# Patient Record
Sex: MALE | Race: WHITE | NOT HISPANIC OR LATINO | URBAN - METROPOLITAN AREA
[De-identification: names, ages, dates, MRNs, and addresses within clinical notes are randomized per-mention and may not be internally consistent; named-entity substitution may affect disease eponyms.]

---

## 2019-05-31 ENCOUNTER — OFFICE VISIT (OUTPATIENT)
Dept: URGENT CARE | Facility: CLINIC | Age: 59
End: 2019-05-31

## 2019-05-31 VITALS
WEIGHT: 152 LBS | HEIGHT: 72 IN | HEART RATE: 82 BPM | SYSTOLIC BLOOD PRESSURE: 122 MMHG | BODY MASS INDEX: 20.59 KG/M2 | OXYGEN SATURATION: 98 % | RESPIRATION RATE: 16 BRPM | TEMPERATURE: 98.2 F | DIASTOLIC BLOOD PRESSURE: 80 MMHG

## 2019-05-31 DIAGNOSIS — B02.9 HERPES ZOSTER WITHOUT COMPLICATION: ICD-10-CM

## 2019-05-31 PROCEDURE — 99202 OFFICE O/P NEW SF 15 MIN: CPT | Performed by: EMERGENCY MEDICINE

## 2019-05-31 RX ORDER — VALACYCLOVIR HYDROCHLORIDE 1 G/1
1000 TABLET, FILM COATED ORAL 3 TIMES DAILY
Qty: 21 TAB | Refills: 0 | Status: SHIPPED | OUTPATIENT
Start: 2019-05-31 | End: 2019-06-07

## 2019-05-31 RX ORDER — IBUPROFEN 800 MG/1
800 TABLET ORAL EVERY 8 HOURS PRN
Qty: 20 TAB | Refills: 0 | Status: SHIPPED | OUTPATIENT
Start: 2019-05-31

## 2019-05-31 ASSESSMENT — ENCOUNTER SYMPTOMS
SENSORY CHANGE: 1
FOCAL WEAKNESS: 0
ABDOMINAL PAIN: 0
TINGLING: 0
COUGH: 0
BACK PAIN: 0
FEVER: 0

## 2019-06-01 NOTE — PROGRESS NOTES
Subjective:      Heri Solis is a 58 y.o. male who presents with Rash (x 6 days, painful rash under lt. arm)            Rash   This is a new problem. The current episode started in the past 7 days. The problem has been gradually worsening since onset. The affected locations include the torso and chest. The rash is characterized by blistering and pain. He was exposed to nothing. Pertinent negatives include no cough or fever. Past treatments include nothing. His past medical history is significant for varicella.       Review of Systems   Constitutional: Negative for fever.   Respiratory: Negative for cough.    Cardiovascular: Negative for chest pain.   Gastrointestinal: Negative for abdominal pain.   Musculoskeletal: Negative for back pain.   Skin: Positive for rash. Negative for itching.   Neurological: Positive for sensory change. Negative for tingling and focal weakness.     PMH:  has no past medical history on file.  MEDS:   Current Outpatient Prescriptions:   •  valacyclovir (VALTREX) 1 GM Tab, Take 1 Tab by mouth 3 times a day for 7 days., Disp: 21 Tab, Rfl: 0  •  ibuprofen (MOTRIN) 800 MG Tab, Take 1 Tab by mouth every 8 hours as needed for Moderate Pain or Inflammation., Disp: 20 Tab, Rfl: 0  ALLERGIES: No Known Allergies  SURGHX: History reviewed. No pertinent surgical history.  SOCHX:  reports that he has never smoked. He has never used smokeless tobacco.  FH: family history is not on file.       Objective:     /80 (BP Location: Right arm, Patient Position: Sitting, BP Cuff Size: Adult)   Pulse 82   Temp 36.8 °C (98.2 °F) (Temporal)   Resp 16   Ht 1.829 m (6')   Wt 68.9 kg (152 lb)   SpO2 98%   BMI 20.61 kg/m²      Physical Exam   Constitutional: He appears well-developed and well-nourished. He is cooperative. He does not have a sickly appearance. He does not appear ill. No distress.   Cardiovascular: Normal rate, regular rhythm and normal heart sounds.    Pulmonary/Chest: Effort normal and  breath sounds normal.   Abdominal: He exhibits no distension. There is no CVA tenderness.   Musculoskeletal:        Thoracic back: He exhibits no bony tenderness.   Neurological: He is alert.   Skin: Skin is warm and dry. Rash noted. Rash is vesicular.   Patches of erythematous based vesicular eruption left scapular, axillary, pectoral region.  No evidence of secondary bacterial infection.   Psychiatric: He has a normal mood and affect.               Assessment/Plan:     1. Herpes zoster without complication  - valacyclovir (VALTREX) 1 GM Tab; Take 1 Tab by mouth 3 times a day for 7 days.  Dispense: 21 Tab; Refill: 0  - ibuprofen (MOTRIN) 800 MG Tab; Take 1 Tab by mouth every 8 hours as needed for Moderate Pain or Inflammation.  Dispense: 20 Tab; Refill: 0  F/U PCP

## 2019-06-05 ENCOUNTER — NON-PROVIDER VISIT (OUTPATIENT)
Dept: URGENT CARE | Facility: CLINIC | Age: 59
End: 2019-06-05

## 2019-06-05 DIAGNOSIS — Z02.1 PRE-EMPLOYMENT DRUG SCREENING: ICD-10-CM

## 2019-06-05 LAB
AMP AMPHETAMINE: NORMAL
COC COCAINE: NORMAL
INT CON NEG: NORMAL
INT CON POS: NORMAL
MET METHAMPHETAMINES: NORMAL
OPI OPIATES: NORMAL
PCP PHENCYCLIDINE: NORMAL
POC DRUG COMMENT 753798-OCCUPATIONAL HEALTH: NORMAL
THC: NORMAL

## 2019-06-05 PROCEDURE — 80305 DRUG TEST PRSMV DIR OPT OBS: CPT | Performed by: NURSE PRACTITIONER

## 2020-02-13 ENCOUNTER — TELEPHONE (OUTPATIENT)
Dept: SCHEDULING | Facility: IMAGING CENTER | Age: 60
End: 2020-02-13

## 2021-03-15 DIAGNOSIS — Z23 NEED FOR VACCINATION: ICD-10-CM

## 2023-01-26 PROBLEM — S52.502A DISTAL RADIUS FRACTURE, LEFT: Status: ACTIVE | Noted: 2023-01-26

## 2023-01-26 NOTE — H&P (VIEW-ONLY)
[facility] ORTHO TRAUMA    ORTHOPAEDIC TRAUMA PROGRESS NOTE    History of Present Illness:    Patient is following for nonoperative management management of left wrist fracture sustained on 12/11/2022.  We have been continue to watch this and up until last week his pain was fairly well controlled.  He is not sure if he did something in his sleep that reinjured it but his pain has been getting worse and he feels more swollen and stiff.  He tells me that initially he did not elect to do surgery because he did not have insurance coverage at that time but he does now and he is very interested in seeing if he can get this fixed.  He is very active and is worried that if he does not get it fixed operatively that he will regain as much range of motion.  No focal weakness or paresthesias    ROS: negative otherwise than mentioned in HPI    Chart Review:  All past medical/surgical/family/social histories, medications, and allergies were reviewed and there are no changes.    Physical Exam:  There were no vitals filed for this visit.  General: Awake, appropriate, cooperative, and in no acute distress  HEENT: Normocephalic, atraumatic  Neck: Supple, no pain to motion  Chest/Pulmonary: breathing unlabored, no audible wheezing  Psych: Alert and oriented x3. Normal mood and affect.  Ortho: The patient is still mildly tender over the distal radius and has notable swelling about the wrist and hand.  He is not able to make a fist without assistance, but he can range all fingers freely and his EPL is intact.  He is very stiff at the wrist as expected from being in a cast for 7 weeks. NVI      Radiographs:  DX-WRIST-COMPLETE 3+ LEFT  Plain film x-ray taken today independently reviewed by myself include ap   lateral and oblique views left wrist demonstrating subacute distal radius   fracture with decreased radial height and dorsal angulation      Assessment & Plan:  Patient is following for nonoperative management management of left wrist  fracture sustained on 12/11/2022.     I had a thorough discussion with the patient regarding the diagnosis including both operative and nonoperative treatment.  After obtaining consent from the patient, we will proceed with operative management. I recommended a ORIF left wrist. Risks of surgery include, but not limited to, wound problems, infection, nerve injury, vascular injury, need for further surgery. Risk for weakness, stiffness, blood clots, chance for reinjury, malunion, nonunion, overcorrection, undercorrection and recurrence. I discussed the risks/ benefits/ complications associated with narcotic use including the potential for addiction. All of the patient's questions were answered today. We will schedule this in the near future at his convenience. I provided him a  velcro wrist brace to wear post-operatively. I prescribed the patient some pain medications. Myself and/or Dr. Fu will follow up with them postoperatively. Consulted Dr. Fu who agrees.       Patient was in agreement with the above-mentioned plan; all questions were answered to their apparent satisfaction.    Ashlee Whatley P.A.-C.  Date: 1/26/2023  Time: 1:56 PM            The EPIC system uses voice transcription software. This is not formal transcription service but is partially electronic. Whilst I have checked the document for errors and corrected them to the best of my ability, there may be grammatical and typographical errors as well as incorrect words placed due to the transcription software that I did not detect and correct

## 2023-01-27 ENCOUNTER — PRE-ADMISSION TESTING (OUTPATIENT)
Dept: ADMISSIONS | Facility: MEDICAL CENTER | Age: 63
End: 2023-01-27
Attending: ORTHOPAEDIC SURGERY
Payer: MEDICAID

## 2023-01-27 ENCOUNTER — ANESTHESIA EVENT (OUTPATIENT)
Dept: SURGERY | Facility: MEDICAL CENTER | Age: 63
End: 2023-01-27
Payer: MEDICAID

## 2023-01-28 ENCOUNTER — APPOINTMENT (OUTPATIENT)
Dept: RADIOLOGY | Facility: MEDICAL CENTER | Age: 63
End: 2023-01-28
Attending: ORTHOPAEDIC SURGERY
Payer: MEDICAID

## 2023-01-28 ENCOUNTER — ANESTHESIA (OUTPATIENT)
Dept: SURGERY | Facility: MEDICAL CENTER | Age: 63
End: 2023-01-28
Payer: MEDICAID

## 2023-01-28 ENCOUNTER — HOSPITAL ENCOUNTER (OUTPATIENT)
Facility: MEDICAL CENTER | Age: 63
End: 2023-01-28
Attending: ORTHOPAEDIC SURGERY | Admitting: ORTHOPAEDIC SURGERY
Payer: MEDICAID

## 2023-01-28 VITALS
OXYGEN SATURATION: 97 % | BODY MASS INDEX: 22.28 KG/M2 | WEIGHT: 164.46 LBS | TEMPERATURE: 97.6 F | RESPIRATION RATE: 19 BRPM | HEART RATE: 82 BPM | HEIGHT: 72 IN | SYSTOLIC BLOOD PRESSURE: 129 MMHG | DIASTOLIC BLOOD PRESSURE: 70 MMHG

## 2023-01-28 DIAGNOSIS — G89.18 POSTOPERATIVE PAIN: ICD-10-CM

## 2023-01-28 DIAGNOSIS — S52.502A CLOSED TRAUMATIC DISPLACED FRACTURE OF DISTAL END OF LEFT RADIUS, INITIAL ENCOUNTER: ICD-10-CM

## 2023-01-28 PROCEDURE — 160028 HCHG SURGERY MINUTES - 1ST 30 MINS LEVEL 3: Performed by: ORTHOPAEDIC SURGERY

## 2023-01-28 PROCEDURE — 160046 HCHG PACU - 1ST 60 MINS PHASE II: Performed by: ORTHOPAEDIC SURGERY

## 2023-01-28 PROCEDURE — 160002 HCHG RECOVERY MINUTES (STAT): Performed by: ORTHOPAEDIC SURGERY

## 2023-01-28 PROCEDURE — 25400 REPAIR RADIUS OR ULNA: CPT | Mod: LT | Performed by: ORTHOPAEDIC SURGERY

## 2023-01-28 PROCEDURE — 25290 INCISE WRIST/FOREARM TENDON: CPT | Mod: LT | Performed by: ORTHOPAEDIC SURGERY

## 2023-01-28 PROCEDURE — 64415 NJX AA&/STRD BRCH PLXS IMG: CPT | Mod: 59,LT | Performed by: ANESTHESIOLOGY

## 2023-01-28 PROCEDURE — 160035 HCHG PACU - 1ST 60 MINS PHASE I: Performed by: ORTHOPAEDIC SURGERY

## 2023-01-28 PROCEDURE — 160047 HCHG PACU  - EA ADDL 30 MINS PHASE II: Performed by: ORTHOPAEDIC SURGERY

## 2023-01-28 PROCEDURE — 160009 HCHG ANES TIME/MIN: Performed by: ORTHOPAEDIC SURGERY

## 2023-01-28 PROCEDURE — C1713 ANCHOR/SCREW BN/BN,TIS/BN: HCPCS | Performed by: ORTHOPAEDIC SURGERY

## 2023-01-28 PROCEDURE — 700111 HCHG RX REV CODE 636 W/ 250 OVERRIDE (IP)

## 2023-01-28 PROCEDURE — 160039 HCHG SURGERY MINUTES - EA ADDL 1 MIN LEVEL 3: Performed by: ORTHOPAEDIC SURGERY

## 2023-01-28 PROCEDURE — 64415 NJX AA&/STRD BRCH PLXS IMG: CPT | Performed by: ORTHOPAEDIC SURGERY

## 2023-01-28 PROCEDURE — 01830 ANES ARTHR/NDSC WRST/HND NOS: CPT | Performed by: ANESTHESIOLOGY

## 2023-01-28 PROCEDURE — 160048 HCHG OR STATISTICAL LEVEL 1-5: Performed by: ORTHOPAEDIC SURGERY

## 2023-01-28 PROCEDURE — 700111 HCHG RX REV CODE 636 W/ 250 OVERRIDE (IP): Performed by: ANESTHESIOLOGY

## 2023-01-28 PROCEDURE — 700105 HCHG RX REV CODE 258: Performed by: ORTHOPAEDIC SURGERY

## 2023-01-28 PROCEDURE — 160025 RECOVERY II MINUTES (STATS): Performed by: ORTHOPAEDIC SURGERY

## 2023-01-28 PROCEDURE — 700101 HCHG RX REV CODE 250: Performed by: ANESTHESIOLOGY

## 2023-01-28 PROCEDURE — 73100 X-RAY EXAM OF WRIST: CPT | Mod: LT

## 2023-01-28 DEVICE — SCREW LOCKING 2.6MM X 18MM VARIABLE ANGLE LOCKING 2TX5=10 (1EA): Type: IMPLANTABLE DEVICE | Site: WRIST | Status: FUNCTIONAL

## 2023-01-28 DEVICE — SCREW LOCKING 2.6MM X 16MM VARIABLE ANGLE LOCKING 2TX5=10 (1EA): Type: IMPLANTABLE DEVICE | Site: WRIST | Status: FUNCTIONAL

## 2023-01-28 DEVICE — IMPLANTABLE DEVICE: Type: IMPLANTABLE DEVICE | Site: WRIST | Status: FUNCTIONAL

## 2023-01-28 DEVICE — SCREW 3.5 MM NON-LOCKING TI X 12MM LONG (6TX8+2TX5=58): Type: IMPLANTABLE DEVICE | Site: WRIST | Status: FUNCTIONAL

## 2023-01-28 DEVICE — SCREW LOCKING 2.3MM X 22MM 2TX5=10 (1EA): Type: IMPLANTABLE DEVICE | Site: WRIST | Status: FUNCTIONAL

## 2023-01-28 RX ORDER — LIDOCAINE HYDROCHLORIDE 20 MG/ML
INJECTION, SOLUTION EPIDURAL; INFILTRATION; INTRACAUDAL; PERINEURAL PRN
Status: DISCONTINUED | OUTPATIENT
Start: 2023-01-28 | End: 2023-01-28 | Stop reason: SURG

## 2023-01-28 RX ORDER — ROPIVACAINE HYDROCHLORIDE 5 MG/ML
INJECTION, SOLUTION EPIDURAL; INFILTRATION; PERINEURAL
Status: COMPLETED | OUTPATIENT
Start: 2023-01-28 | End: 2023-01-28

## 2023-01-28 RX ORDER — LABETALOL HYDROCHLORIDE 5 MG/ML
5 INJECTION, SOLUTION INTRAVENOUS
Status: DISCONTINUED | OUTPATIENT
Start: 2023-01-28 | End: 2023-01-28 | Stop reason: HOSPADM

## 2023-01-28 RX ORDER — OXYCODONE HYDROCHLORIDE 5 MG/1
5-10 TABLET ORAL EVERY 6 HOURS PRN
Qty: 30 TABLET | Refills: 0 | Status: SHIPPED | OUTPATIENT
Start: 2023-01-28 | End: 2023-02-04

## 2023-01-28 RX ORDER — DIPHENHYDRAMINE HYDROCHLORIDE 50 MG/ML
12.5 INJECTION INTRAMUSCULAR; INTRAVENOUS
Status: DISCONTINUED | OUTPATIENT
Start: 2023-01-28 | End: 2023-01-28 | Stop reason: HOSPADM

## 2023-01-28 RX ORDER — DEXAMETHASONE SODIUM PHOSPHATE 4 MG/ML
INJECTION, SOLUTION INTRA-ARTICULAR; INTRALESIONAL; INTRAMUSCULAR; INTRAVENOUS; SOFT TISSUE PRN
Status: DISCONTINUED | OUTPATIENT
Start: 2023-01-28 | End: 2023-01-28 | Stop reason: SURG

## 2023-01-28 RX ORDER — MIDAZOLAM HYDROCHLORIDE 1 MG/ML
INJECTION INTRAMUSCULAR; INTRAVENOUS PRN
Status: DISCONTINUED | OUTPATIENT
Start: 2023-01-28 | End: 2023-01-28 | Stop reason: SURG

## 2023-01-28 RX ORDER — HYDROMORPHONE HYDROCHLORIDE 1 MG/ML
0.4 INJECTION, SOLUTION INTRAMUSCULAR; INTRAVENOUS; SUBCUTANEOUS
Status: DISCONTINUED | OUTPATIENT
Start: 2023-01-28 | End: 2023-01-28 | Stop reason: HOSPADM

## 2023-01-28 RX ORDER — HALOPERIDOL 5 MG/ML
1 INJECTION INTRAMUSCULAR
Status: DISCONTINUED | OUTPATIENT
Start: 2023-01-28 | End: 2023-01-28 | Stop reason: HOSPADM

## 2023-01-28 RX ORDER — CEFAZOLIN SODIUM 1 G/3ML
2 INJECTION, POWDER, FOR SOLUTION INTRAMUSCULAR; INTRAVENOUS ONCE
Status: COMPLETED | OUTPATIENT
Start: 2023-01-28 | End: 2023-01-28

## 2023-01-28 RX ORDER — METOPROLOL TARTRATE 1 MG/ML
1 INJECTION, SOLUTION INTRAVENOUS
Status: DISCONTINUED | OUTPATIENT
Start: 2023-01-28 | End: 2023-01-28 | Stop reason: HOSPADM

## 2023-01-28 RX ORDER — MEPERIDINE HYDROCHLORIDE 25 MG/ML
12.5 INJECTION INTRAMUSCULAR; INTRAVENOUS; SUBCUTANEOUS
Status: DISCONTINUED | OUTPATIENT
Start: 2023-01-28 | End: 2023-01-28 | Stop reason: HOSPADM

## 2023-01-28 RX ORDER — OXYCODONE HCL 5 MG/5 ML
10 SOLUTION, ORAL ORAL
Status: DISCONTINUED | OUTPATIENT
Start: 2023-01-28 | End: 2023-01-28 | Stop reason: HOSPADM

## 2023-01-28 RX ORDER — OXYCODONE HCL 5 MG/5 ML
5 SOLUTION, ORAL ORAL
Status: DISCONTINUED | OUTPATIENT
Start: 2023-01-28 | End: 2023-01-28 | Stop reason: HOSPADM

## 2023-01-28 RX ORDER — MIDAZOLAM HYDROCHLORIDE 1 MG/ML
1 INJECTION INTRAMUSCULAR; INTRAVENOUS
Status: DISCONTINUED | OUTPATIENT
Start: 2023-01-28 | End: 2023-01-28 | Stop reason: HOSPADM

## 2023-01-28 RX ORDER — MAGNESIUM SULFATE HEPTAHYDRATE 40 MG/ML
INJECTION, SOLUTION INTRAVENOUS PRN
Status: DISCONTINUED | OUTPATIENT
Start: 2023-01-28 | End: 2023-01-28 | Stop reason: SURG

## 2023-01-28 RX ORDER — ONDANSETRON 2 MG/ML
4 INJECTION INTRAMUSCULAR; INTRAVENOUS
Status: DISCONTINUED | OUTPATIENT
Start: 2023-01-28 | End: 2023-01-28 | Stop reason: HOSPADM

## 2023-01-28 RX ORDER — DEXAMETHASONE SODIUM PHOSPHATE 4 MG/ML
INJECTION, SOLUTION INTRA-ARTICULAR; INTRALESIONAL; INTRAMUSCULAR; INTRAVENOUS; SOFT TISSUE
Status: COMPLETED | OUTPATIENT
Start: 2023-01-28 | End: 2023-01-28

## 2023-01-28 RX ORDER — HYDROMORPHONE HYDROCHLORIDE 1 MG/ML
0.2 INJECTION, SOLUTION INTRAMUSCULAR; INTRAVENOUS; SUBCUTANEOUS
Status: DISCONTINUED | OUTPATIENT
Start: 2023-01-28 | End: 2023-01-28 | Stop reason: HOSPADM

## 2023-01-28 RX ORDER — HYDROMORPHONE HYDROCHLORIDE 1 MG/ML
0.1 INJECTION, SOLUTION INTRAMUSCULAR; INTRAVENOUS; SUBCUTANEOUS
Status: DISCONTINUED | OUTPATIENT
Start: 2023-01-28 | End: 2023-01-28 | Stop reason: HOSPADM

## 2023-01-28 RX ORDER — SODIUM CHLORIDE, SODIUM LACTATE, POTASSIUM CHLORIDE, CALCIUM CHLORIDE 600; 310; 30; 20 MG/100ML; MG/100ML; MG/100ML; MG/100ML
INJECTION, SOLUTION INTRAVENOUS CONTINUOUS
Status: ACTIVE | OUTPATIENT
Start: 2023-01-28 | End: 2023-01-28

## 2023-01-28 RX ORDER — ONDANSETRON 2 MG/ML
INJECTION INTRAMUSCULAR; INTRAVENOUS PRN
Status: DISCONTINUED | OUTPATIENT
Start: 2023-01-28 | End: 2023-01-28 | Stop reason: SURG

## 2023-01-28 RX ORDER — ALBUTEROL SULFATE 2.5 MG/3ML
2.5 SOLUTION RESPIRATORY (INHALATION)
Status: DISCONTINUED | OUTPATIENT
Start: 2023-01-28 | End: 2023-01-28 | Stop reason: HOSPADM

## 2023-01-28 RX ORDER — SODIUM CHLORIDE, SODIUM LACTATE, POTASSIUM CHLORIDE, CALCIUM CHLORIDE 600; 310; 30; 20 MG/100ML; MG/100ML; MG/100ML; MG/100ML
INJECTION, SOLUTION INTRAVENOUS CONTINUOUS
Status: DISCONTINUED | OUTPATIENT
Start: 2023-01-28 | End: 2023-01-28 | Stop reason: HOSPADM

## 2023-01-28 RX ORDER — HYDRALAZINE HYDROCHLORIDE 20 MG/ML
5 INJECTION INTRAMUSCULAR; INTRAVENOUS
Status: DISCONTINUED | OUTPATIENT
Start: 2023-01-28 | End: 2023-01-28 | Stop reason: HOSPADM

## 2023-01-28 RX ADMIN — ROPIVACAINE HYDROCHLORIDE 20 ML: 5 INJECTION, SOLUTION EPIDURAL; INFILTRATION; PERINEURAL at 08:31

## 2023-01-28 RX ADMIN — EPHEDRINE SULFATE 5 MG: 50 INJECTION, SOLUTION INTRAVENOUS at 09:26

## 2023-01-28 RX ADMIN — MIDAZOLAM HYDROCHLORIDE 1 MG: 1 INJECTION, SOLUTION INTRAMUSCULAR; INTRAVENOUS at 08:31

## 2023-01-28 RX ADMIN — LIDOCAINE HYDROCHLORIDE 80 MG: 20 INJECTION, SOLUTION EPIDURAL; INFILTRATION; INTRACAUDAL at 09:16

## 2023-01-28 RX ADMIN — EPHEDRINE SULFATE 5 MG: 50 INJECTION, SOLUTION INTRAVENOUS at 09:46

## 2023-01-28 RX ADMIN — MAGNESIUM SULFATE HEPTAHYDRATE 1 G: 40 INJECTION, SOLUTION INTRAVENOUS at 09:31

## 2023-01-28 RX ADMIN — EPHEDRINE SULFATE 5 MG: 50 INJECTION, SOLUTION INTRAVENOUS at 09:36

## 2023-01-28 RX ADMIN — DEXAMETHASONE SODIUM PHOSPHATE 4 MG: 4 INJECTION, SOLUTION INTRA-ARTICULAR; INTRALESIONAL; INTRAMUSCULAR; INTRAVENOUS; SOFT TISSUE at 09:25

## 2023-01-28 RX ADMIN — SODIUM CHLORIDE, POTASSIUM CHLORIDE, SODIUM LACTATE AND CALCIUM CHLORIDE: 600; 310; 30; 20 INJECTION, SOLUTION INTRAVENOUS at 06:54

## 2023-01-28 RX ADMIN — ONDANSETRON 4 MG: 2 INJECTION INTRAMUSCULAR; INTRAVENOUS at 09:39

## 2023-01-28 RX ADMIN — SODIUM CHLORIDE, POTASSIUM CHLORIDE, SODIUM LACTATE AND CALCIUM CHLORIDE: 600; 310; 30; 20 INJECTION, SOLUTION INTRAVENOUS at 10:16

## 2023-01-28 RX ADMIN — FENTANYL CITRATE 50 MCG: 50 INJECTION, SOLUTION INTRAMUSCULAR; INTRAVENOUS at 08:31

## 2023-01-28 RX ADMIN — CEFAZOLIN 2 G: 330 INJECTION, POWDER, FOR SOLUTION INTRAMUSCULAR; INTRAVENOUS at 09:19

## 2023-01-28 RX ADMIN — PROPOFOL 200 MG: 10 INJECTION, EMULSION INTRAVENOUS at 09:16

## 2023-01-28 RX ADMIN — EPHEDRINE SULFATE 5 MG: 50 INJECTION, SOLUTION INTRAVENOUS at 09:25

## 2023-01-28 RX ADMIN — DEXAMETHASONE SODIUM PHOSPHATE 4 MG: 4 INJECTION, SOLUTION INTRA-ARTICULAR; INTRALESIONAL; INTRAMUSCULAR; INTRAVENOUS; SOFT TISSUE at 08:31

## 2023-01-28 RX ADMIN — FENTANYL CITRATE 50 MCG: 50 INJECTION, SOLUTION INTRAMUSCULAR; INTRAVENOUS at 09:14

## 2023-01-28 ASSESSMENT — PAIN SCALES - GENERAL: PAIN_LEVEL: 0

## 2023-01-28 ASSESSMENT — PAIN DESCRIPTION - PAIN TYPE: TYPE: ACUTE PAIN

## 2023-01-28 NOTE — ANESTHESIA POSTPROCEDURE EVALUATION
Patient: Heri Solis    Procedure Summary     Date: 01/28/23 Room / Location: [place] [address] / SURGERY [facility]    Anesthesia Start: 0913 Anesthesia Stop: 1113    Procedure: LEFT WRIST OPEN REDUCTION INTERNAL FIXATION (Left: Wrist) Diagnosis:       Distal radius fracture, left      (Distal radius fracture, left [S52.502A])    Surgeons: Erick Saunders M.D. Responsible Provider: Terell Andersen M.D.    Anesthesia Type: general ASA Status: 2          Final Anesthesia Type: general  Last vitals  BP   Blood Pressure: 132/69    Temp   36.4 °C (97.6 °F)    Pulse   82   Resp   18    SpO2   97 %      Anesthesia Post Evaluation    Patient location during evaluation: PACU  Patient participation: complete - patient participated  Level of consciousness: awake and alert  Pain score: 0    Airway patency: patent  Anesthetic complications: no  Cardiovascular status: hemodynamically stable  Respiratory status: acceptable  Hydration status: euvolemic    PONV: none          No notable events documented.     Nurse Pain Score: 0 (NPRS)

## 2023-01-28 NOTE — ANESTHESIA TIME REPORT
Anesthesia Start and Stop Event Times     Date Time Event    1/28/2023 0847 Ready for Procedure     0913 Anesthesia Start     1113 Anesthesia Stop        Responsible Staff  01/28/23    Name Role Begin End    Terell Andersen M.D. Anesth 0913 1113        Overtime Reason:  no overtime (within assigned shift)    Comments:

## 2023-01-28 NOTE — DISCHARGE INSTRUCTIONS
HOME CARE INSTRUCTIONS    ACTIVITY: Rest and take it easy for the first 24 hours.  A responsible adult is recommended to remain with you during that time.  It is normal to feel sleepy.  We encourage you to not do anything that requires balance, judgment or coordination.    FOR 24 HOURS DO NOT:  Drive, operate machinery or run household appliances.  Drink beer or alcoholic beverages.  Make important decisions or sign legal documents.    SPECIAL INSTRUCTIONS:   Leave the wrist brace in place until follow-up.    Work on range of motion to the fingers early and often.    No lifting pushing or pulling with the left hand.    Elevate the hand at rest for swelling and pain control.    Return to the office in 10 to 14 days for wound check and suture removal and repeat x-rays.    We will begin physical therapy at that time.    DIET: To avoid nausea, slowly advance diet as tolerated, avoiding spicy or greasy foods for the first day.  Add more substantial food to your diet according to your physician's instructions.  Babies can be fed formula or breast milk as soon as they are hungry.  INCREASE FLUIDS AND FIBER TO AVOID CONSTIPATION.    SURGICAL DRESSING/BATHING: Keep dressing clean and dry until follow up appointment.    MEDICATIONS: Resume taking daily medication.  Take prescribed pain medication with food.  If no medication is prescribed, you may take non-aspirin pain medication if needed.  PAIN MEDICATION CAN BE VERY CONSTIPATING.  Take a stool softener or laxative such as senokot, pericolace, or milk of magnesia if needed.    Prescription given for .  Last pain medication given at No oral pain medications given during recovery.    A follow-up appointment should be arranged with your doctor in 1-2 Weeks; call to schedule.    You should CALL YOUR PHYSICIAN if you develop:  Fever greater than 101 degrees F.  Pain not relieved by medication, or persistent nausea or vomiting.  Excessive bleeding (blood soaking through dressing)  or unexpected drainage from the wound.  Extreme redness or swelling around the incision site, drainage of pus or foul smelling drainage.  Inability to urinate or empty your bladder within 8 hours.  Problems with breathing or chest pain.    You should call 911 if you develop problems with breathing or chest pain.  If you are unable to contact your doctor or surgical center, you should go to the nearest emergency room or urgent care center.      Physician's telephone #: [phone] Dr. Saunders    MILD FLU-LIKE SYMPTOMS ARE NORMAL.  YOU MAY EXPERIENCE GENERALIZED MUSCLE ACHES, THROAT IRRITATION, HEADACHE AND/OR SOME NAUSEA.    If any questions arise, call your doctor.  If your doctor is not available, please feel free to call the Surgical Center at [phone].  The Center is open Monday through Friday from 7AM to 7PM.      A registered nurse may call you a few days after your surgery to see how you are doing after your procedure.    You may also receive a survey in the mail within the next two weeks and we ask that you take a few moments to complete the survey and return it to us.  Our goal is to provide you with very good care and we value your comments.     Depression / Suicide Risk    As you are discharged from this [facility] facility, it is important to learn how to keep safe from harming yourself.    Recognize the warning signs:  Abrupt changes in personality, positive or negative- including increase in energy   Giving away possessions  Change in eating patterns- significant weight changes-  positive or negative  Change in sleeping patterns- unable to sleep or sleeping all the time   Unwillingness or inability to communicate  Depression  Unusual sadness, discouragement and loneliness  Talk of wanting to die  Neglect of personal appearance   Rebelliousness- reckless behavior  Withdrawal from people/activities they love  Confusion- inability to concentrate     If you or a loved one observes any of these  behaviors or has concerns about self-harm, here's what you can do:  Talk about it- your feelings and reasons for harming yourself  Remove any means that you might use to hurt yourself (examples: pills, rope, extension cords, firearm)  Get professional help from the community (Mental Health, Substance Abuse, psychological counseling)  Do not be alone:Call your Safe Contact- someone whom you trust who will be there for you.  Call your local CRISIS HOTLINE 590-1524 or 536-196-6101  Call your local Children's Mobile Crisis Response Team [place] (935) 587-4391 or www.[place]  Call the toll free National Suicide Prevention Hotlines   National Suicide Prevention Lifeline 387-280-OBDC (0191)  National Hope Line Network 800-SUICIDE (336-2823)    I acknowledge receipt and understanding of these Home Care instructions.

## 2023-01-28 NOTE — ANESTHESIA PROCEDURE NOTES
Airway    Date/Time: 1/28/2023 9:16 AM  Performed by: Terell Andersen M.D.  Authorized by: Terell Andersen M.D.     Location:  OR  Urgency:  Elective  Indications for Airway Management:  Anesthesia      Spontaneous Ventilation: absent    Sedation Level:  Deep  Preoxygenated: Yes    Mask Difficulty Assessment:  1 - vent by mask  Final Airway Type:  Supraglottic airway  Final Supraglottic Airway:  Standard LMA    SGA Size:  4  Number of Attempts at Approach:  1

## 2023-01-28 NOTE — DISCHARGE INSTR - OTHER INFO
Leave the wrist brace in place until follow-up.  Work on range of motion to the fingers early and often.  No lifting pushing or pulling with the left hand.  Elevate the hand at rest for swelling and pain control.  Return to the office in 10 to 14 days for wound check and suture removal and repeat x-rays.  We will begin physical therapy at that time

## 2023-01-28 NOTE — INTERVAL H&P NOTE
H&P updated. 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  We discussed treatment options and he wishes to proceed with open reduction internal fixation of the malunion of the left distal radius.

## 2023-01-28 NOTE — LETTER
January 26, 2023    Patient Name: Heri Solis  Surgeon Name: Erick Saunders M.D.  Surgery Facility: [facility] ([address] [place] [address])  Surgery Date: 1/28/2023    The time of your surgery is not final and may change up to and until the day of your surgery. You will be contacted 24-48 hours prior to your surgery date with your check-in and surgery time.    If you will not be at one of the below numbers please call the surgery scheduler at [phone]  Preferred Phone: [phone]    BEFORE YOUR SURGERY   Pre Registration and/or Lab Work must be done within and no earlier than 28 days prior to your surgery date. Please call [facility] at [phone] for an appointment as soon as possible.    DAY OF YOUR SURGERY  Nothing to eat or drink after midnight     Refrain from smoking any substance after midnight prior to surgery. Smoking may interfere with the anesthetic and frequently produces nausea during the recovery period.    Continue taking all lifesaving medications. Including the morning of your surgery with small sip of water.    Please arrive at the hospital/surgery center at the check-in time provided.     An adult will need to bring you and take you home after your surgery.     AFTER YOUR SURGERY  Post op Appointment:   Date: 2.9.23   Time: 9:40 AM   With: Erick Saunders M.D.   Location: [address] [place] [address]    - Post Surgery - You will need someone to drive you home  - Post Surgery - You will need someone to stay with you for 24 hours    TIME OFF WORK  FMLA or Disability forms can be faxed directly to: [phone] or you may drop them off at [address] [place] [address]. Our office charges a $35.00 fee per form. Forms will be completed within 10 business days of drop off and payment received. For the status of your forms you may contact our disability office directly [phone].    MEDICATION INSTRUCTIONS **Please read section  completely**    The following medications should be stopped a minimum of 10 days prior to surgery:  All over the counter, Supplements & Herbal medications    Anorectics: Phentermine (Adipex-P, Lomaira and Suprenza), Phentermine-topiramate (Qsymia), Bupropion-naltrexone (Contrave)    Opiod Partial Agonists/Opioid Antagonists: Buprenorphine (Subocone, Belbuca, Butrans, Probuphine Implant, Sublocade), Naltrexone (ReVia, Vivitrol), Naloxone    Amphetamines: Dextroamphetamine/Amphetamine (Adderall, Mydayis), Methylphenidate Hydrochloride (Concerta, Metadate, Methylin, Ritalin)    The following medications should be stopped 5 days prior to surgery:  Blood Thinners: Any Aspirin, Aspirin products, anti-inflammatories such as ibuprofen and any blood thinners such as Coumadin and Plavix. Please consult your prescribing physician if you are on life saving blood thinners, in regards to when to stop medications prior to surgery.     The following medications should be stopped a minimum of 3 days prior to surgery:  PDE-5 inhibitors: Sildenafil (Viagra), Tadalafil (Cialis), Vardenafil (Levitra), Avanafil (Stendra)    MAO Inhibitors: Rasagiline (Azilect), Selegiline (Eldepryl, Emsam, Selapar), Isocarboxazid (Marplan), Phenelzine (Nardil)         COVID and INFLUENZA NOTICE TO PATIENTS    Currently, the [facility] does not routinely test patients for COVID-19 or Influenza prior to their elective surgery.  However, if patients develop the following symptoms prior to their surgery date, they should voluntarily test for COVID-19 and Influenza and notify the surgical office of their condition and results.  The symptoms warranting testing would be any two of the following:    Fever (Temp above 100.4 F)  Chills  Cough  Shortness of breath or difficulty breathing  Fatigue  Myalgias (muscle or body aches)  Headache  Sore Throat  Congestion or Runny Nose  Nausea or vomiting  Diarrhea  New loss of taste or smell    Having  these symptoms prior to surgery can significantly increase your risk of morbidity and mortality under anesthesia, which may compromise your health and require a transfer to a hospital for a higher level of care.  Therefore, it is advisable to notify the surgical team of any illness in order to get information for the appropriate time to delay the surgery to minimize these preventable risks.    Your health and safety are our number one priority at the [facility], and we are thankful that you entrust us with your care.  Please help us ensure the best possible surgical and anesthetic outcome by sharing appropriate health information with our perioperative team and staff.  We look forward to taking great care of you!    Thank you for your time and consideration on this matter.    Andrew Laura MD  Medical Director  Anesthesiologist  [facility]

## 2023-01-28 NOTE — OR NURSING
@1145 Pt arrived to Phase 2. PT has no complaints of pain or nausea. Vital signs stable. Dressing site is clean dry and intact.    @1159 Discharge instructions discussed with patient at patients bedside. Pt verbalizes understanding. Pt states he would like more time before discharge.     @1242 PIV removed. PT able to dress self without assistance.    @1310 Pt discharged to home with all belongings.

## 2023-01-28 NOTE — ANESTHESIA PROCEDURE NOTES
Peripheral Block    Date/Time: 1/28/2023 8:31 AM  Performed by: Terell Andersen M.D.  Authorized by: Terell Andersen M.D.     Patient Location:  Pre-op  Start Time:  1/28/2023 8:31 AM  End Time:  1/28/2023 8:36 AM  Reason for Block: at surgeon's request and post-op pain management ONLY    patient identified, IV checked, site marked, risks and benefits discussed, surgical consent, monitors and equipment checked, pre-op evaluation and timeout performed    Patient Position:  Supine  Prep: ChloraPrep    Monitoring:  Heart rate, continuous pulse ox and cardiac monitor  Block Region:  Upper Extremity  Upper Extremity - Block Type:  BRACHIAL PLEXUS block, Supraclavicular approach    Laterality:  Left  Procedures: ultrasound guided  Image captured, interpreted and electronically stored.  Local Infiltration:  Lidocaine  Strength:  1 %  Dose:  3 ml  Block Type:  Single-shot  Needle Length:  50mm  Needle Gauge:  22 G  Needle Localization:  Ultrasound guidance  Injection Assessment:  Negative aspiration for heme, no paresthesia on injection, incremental injection and local visualized surrounding nerve on ultrasound  Evidence of intravascular injection: No     Needle tip and nerves well visualized.   Patient awake and talking throughout.   No complaints.  No apparent complications

## 2023-01-28 NOTE — ANESTHESIA PREPROCEDURE EVALUATION
Case: [id number] Date/Time: 01/28/23 0815    Procedure: LEFT WRIST OPEN REDUCTION INTERNAL FIXATION (Left: Wrist)    Anesthesia type: General    Diagnosis: Distal radius fracture, left [S52.502A]    Pre-op diagnosis: Distal radius fracture, left [S52.502A]    Location: [place] [address] / SURGERY [facility]    Surgeons: Erick Saunders M.D.      63 yo male, non-healing wrist fracture    P Med Hx:  Arthritis  Pain    P Surg Hx:  ORIF Ankle 1996  No reports of problems with previous anesthesia    Quit smoking 2001,  1ppd x 10 y  Daily THC  Couple beers /day    NPO    Relevant Problems   No relevant active problems       Physical Exam    Airway   Mallampati: II  TM distance: >3 FB  Neck ROM: full       Cardiovascular - normal exam  Rhythm: regular  Rate: normal  (-) murmur     Dental - normal exam           Pulmonary - normal exam  Breath sounds clear to auscultation     Abdominal    Neurological - normal exam                 Anesthesia Plan    ASA 2       Plan - general and peripheral nerve block     Peripheral nerve block will be post-op pain control  Airway plan will be LMA          Induction: intravenous    Postoperative Plan: Postoperative administration of opioids is intended.    Pertinent diagnostic labs and testing reviewed    Informed Consent:    Anesthetic plan and risks discussed with patient.    Use of blood products discussed with: patient whom consented to blood products.

## 2023-02-10 NOTE — OP REPORT
DATE OF OPERATION: 1/28/2023     PREOPERATIVE DIAGNOSIS: Left distal radius malunion    POSTOPERATIVE DIAGNOSIS: Same    PROCEDURE PERFORMED: Open reduction internal fixation of left distal radius malunion, left brachial radialis tenotomy    SURGEON: Erick Saunders M.D.     ASSISTANT: Roni Posey PA-C    ANESTHESIA: General    SPECIMEN: None    ESTIMATED BLOOD LOSS: 10 mL    IMPLANTS: OIC left distal radius plate      INDICATIONS: The patient is a 62 y.o. male who presented with a previous left distal radius fracture that occurred in mid December.  He was initially treated in a short arm cast.  After some additional healing he is concerned that the angulation that was present would affect his function.  He wanted to proceed with operative fixation at that time..  I discussed the risks and benefits of the procedure which include but are not limited to risks of infection, wound healing complication, neurovascular injury, pain, malunion, non-union, malrotation, and the medical risks of anesthesia including MI, stroke, and death.  Alternatives to surgery were also discussed, including non-operative management, which I did not recommend.  The patient was in agreement with the plan to proceed, and the informed consent was signed and documented.  I met with the patient pre-operatively and marked the operative extremity with their agreement.  We proceeded to the operating room.     DESCRIPTION OF PROCEDURE:  Patient was seen in the preoperative holding area on the day of surgery. The operative site was marked with my initials.  he was taken to the operating room and placed supine on the operative table.  Anesthesia was induced.  The operative extremity was prepped and draped in the normal sterile fashion.  Operative pause was conducted and the correct patient, site, side, procedure, and surgeon's initials on extremity were identified.  A volar FCR approach was used to approach the distal radius.  The underlying tendons  and musculature was swept off of the radius including elevating the pronator quadratus.  The fracture was quite solidly healed with hardening callus.  He remained dorsally angulated.  The callus was present from the volar all the way through the dorsal side of the fracture.  This was initially debrided on the volar aspect with a elevator and rongeur.  Using osteotome and an elevator to break up the callus on the dorsal aspect of the wrist.  This created a more mobile articular block.  Due to the chronicity of his fracture though, this was still quite scarred down.  The brachial radialis tendon was released from the radial styloid to help further mobilize the articular block and restore the correct radial inclination.  The bone was quite osteopenic and the fracture was comminuted and intra-articular.  After the fracture was reduced this was initially held with K wires to help maintain the reduction including the radial height and inclination.  The plate was initially secured to the articular block using a nonlocking screw followed by multiple locking screws.  This helped further secure the articular block and allowed some additional height and inclination to be restored.  Due to the highly comminuted nature of his fracture and the tightness of the soft tissues from the chronicity this was notable to be fully restored and there is still some subtle ulnar prominence.  There was good cortical alignment so this may be partially his native position.  The plate was secured to the shaft with nonlocking screws.  Once all hardware secured final fluoroscopic images were obtained.  The wounds were thoroughly irrigated and closed in layered fashion with 3-0 Vicryl and 3-0 nylon.  He was placed into a volar resting splint.  This point he woke in the operating room and was taken to PACU in stable condition.    POSTOPERATIVE PLAN: No lifting pushing or pulling with the left hand and wrist.  Work on range of motion of the fingers  early and often.  Return to clinic in 2 weeks time for wound check, suture removal, repeat x-rays.  Likely begin physical therapy following this visit to help further regain motion.  Continue without any lifting pushing or pulling until the 6-week leatha given the comminution and osteopenic bone      ____________________________________   Erick Saunders M.D.

## 2023-03-15 ENCOUNTER — OCCUPATIONAL THERAPY (OUTPATIENT)
Dept: OCCUPATIONAL THERAPY | Facility: REHABILITATION | Age: 63
End: 2023-03-15
Payer: MEDICAID

## 2023-03-15 DIAGNOSIS — S52.502A TRAUMATIC CLOSED DISPLACED FRACTURE OF DISTAL END OF LEFT RADIUS, INITIAL ENCOUNTER: ICD-10-CM

## 2023-03-15 PROCEDURE — 97165 OT EVAL LOW COMPLEX 30 MIN: CPT

## 2023-03-15 SDOH — ECONOMIC STABILITY: GENERAL: QUALITY OF LIFE: EXCELLENT

## 2023-03-15 ASSESSMENT — ENCOUNTER SYMPTOMS
PAIN SCALE: 2
ALLEVIATING FACTORS: REST
ALLEVIATING FACTORS: ORTHOTICS
EXACERBATED BY: ACTIVITY
PAIN SCALE AT HIGHEST: 5
PAIN TIMING: OCCASIONAL
PAIN SCALE AT LOWEST: 2
QUALITY: DISCOMFORT
ALLEVIATING FACTORS: PAIN MEDICATION

## 2023-03-15 NOTE — OP THERAPY EVALUATION
Outpatient Occupational Therapy  HAND THERAPY INITIAL EVALUATION    [facility] Occupational Therapy [address].  [address]  [place] [address]  Phone:  [phone]  Fax:  [phone]    Date of Evaluation: 03/15/2023    Patient: Heri Solis  YOB: 1960  MRN: [id number]     Referring Provider: Ashlee Whatley P.A.-C.  [address] [place] [address]   Referring Diagnosis Closed traumatic displaced fracture of distal end of left radius, initial encounter [S52.502A]     Time Calculation    Start time: 0100  Stop time: 0145 Time Calculation (min): 45 minutes             Chief Complaint: Extremity Fracture, Hand Weakness, and Wrist Injury    Visit Diagnoses     ICD-10-CM   1. Traumatic closed displaced fracture of distal end of left radius, initial encounter  S52.502A       Subjective:   History of Present Illness:     Date of onset:  2022    Date of surgery:  2023    Mechanism of injury:   62 y.o. male s/p ORIF left wrist by Dr. Saunders on 2023. Distal radius fracture after a mechanical fall on 22.     1. WB Status: Weight-bear as tolerated 6 weeks from postop  2. He can wean out of Velcro wrist brace in the next few weeks  3. We have provided him a new physical therapy referral as well as a list of a few locations that should take his insurance.   4. Pain control: recommend Tylenol 1000mg Q6H and Ibuprofen 800mg Q8H prn pain.  5. F/U with ortho trauma in 6 weeks with left wrist radiographs and range of motion check.  Quality of life:  Excellent  Prior level of function:  Independent with all ADLs, currenlty not working.  Headaches:  no headaches  Ear problems: none  Sleep disturbance:  Difficulty falling asleep  Pain:     Current pain ratin    At best pain ratin    At worst pain ratin    Location:  Mostly in the fingers of the left hand    Quality:  Discomfort    Pain timing:  Occasional    Relieving factors:  Rest, orthotics and pain  "medication    Aggravating factors:  Activity    Progression:  Improving  Social Support:     Lives in:  One-story house    Lives with:  Alone  Hand dominance:  Right  Diagnostic Tests:     X-ray: abnormal    Treatments:     Previous treatment:  Immobilization    Current treatment:  Occupational therapy  Patient Goals:     Patient goals for therapy:  Decreased pain, increased motion, increased strength and independence with ADLs/IADLs    Past Medical History:   Diagnosis Date    Arthritis     Pain 2023    acute pain left wrist and chronic pain left foot rates pain 3/10     Past Surgical History:   Procedure Laterality Date    PB OPEN RX DISTAL RADIUS FX, INTRA-ARTICULAR* Left 2023    Procedure: LEFT WRIST OPEN REDUCTION INTERNAL FIXATION;  Surgeon: Erick Saunders M.D.;  Location: SURGERY [facility];  Service: Orthopedics    FOOT SURGERY      ORIF, ANKLE       Social History     Tobacco Use    Smoking status: Former     Packs/day: 1.00     Years: 10.00     Pack years: 10.00     Types: Cigarettes     Quit date: 2001     Years since quittin.3     Passive exposure: Never    Smokeless tobacco: Never   Substance Use Topics    Alcohol use: Yes     Alcohol/week: 9.0 oz     Types: 15 Cans of beer per week     Comment: \"couple beers a day\"     Family and Occupational History     Socioeconomic History    Marital status: Single     Spouse name: Not on file    Number of children: Not on file    Years of education: Not on file    Highest education level: Not on file   Occupational History    Not on file       Objective     Neurological Testing   Sensation   Wrist/Hand   Left   Intact: hot/cold discrimination  Diminished: light touch      Active Range of Motion     Right Wrist   Wrist flexion: 20 degrees   Wrist extension: 30 degrees   Wrist pronation: WFL  Wrist supination: 0 degrees   Radial deviation: 10 degrees   Ulnar deviation: 10 degrees     Right Thumb     Normal active range of " motion    Right Digits   Normal active range of motion    Passive Range of Motion     Right Wrist   Wrist flexion: 45 degrees   Wrist extension: 40 degrees   Wrist pronation: WFL  Wrist supination: 10 degrees   Radial deviation: 20 degrees   Ulnar deviation: 20 degrees     Strength     Left Wrist/Hand      (2nd hand position)     Trial 1: 0    Right Wrist/Hand      (2nd hand position)     Trial 1: 85    Tests     Left Wrist/Hand   Negative Phalen's sign and Tinel's sign (median nerve).     General Comments     Wrist/Hand Comments  9 hole peg test:  R= 20 seconds  L = 54 seconds       Therapeutic Exercises (CPT 62941):     1. AROM and AAROM of left supination, wrist flexion and extension along with active movements of the hand    2. light resistance theraputty    Therapeutic Exercise Summary:  Initiated HEP with written, verbal and visual instruction.  To complete 3 x a day  Advised patient to wean off of the wrist splint and only wear during tasks that cause pain to wrist  Advised to progress with weight bearing as tolerated.     No charge    Time-based treatments/modalities:         Assessment and Plan:   Problem list/assessment: abnormal or restricted ROM, decreased coordination, decreased HEP knowledge, decreased strength, limited ADL's and pain    Assessment details:  Patient is a 63 y/o male s/p ORIF 1/28/23 from a distal radius fracture on 12/11/22.  Patient presenting with impaired ROM of forearm and wrist, impaired strength, impaired fine motor manipulation/dexterity and impaired function of left non-dominant arm.  Patient would benefit from OT intervention to enhance functional use and independence.    Barriers to therapy:  None    Goals:   Short Term Goals: decrease pain, increase ADL independence, increase range of motion, increase strength and independent with HEP performance  Short term goal timespan:  2-4 weeks    Long Term Goals:   Patient will have full active wrist flexion, RD/UD and  supination for improved function of left UE  Patient will achieve at least 50 degrees of wrist extension to enhance functional use of left UE.  Patient will have at least 50 pounds  strength to enhance functional use of left UE  Patient will score at least 65/80 on the UEFI  Long term goal timespan:  6-8 weeks    Plan:   Occupational/Hand Therapy options:  Occupational therapy treatment to continue  Planned therapy interventions:  Home exercise training, patient/family/caregiver education, adaptive training to increase functional performance, AROM, A/AROM, PROM, passive stretch, graded resistive tasks to increase strength and pain management  Other planned therapy interventions:  97110 x 3  Prognosis: excellent    Frequency:  2x week  Duration in weeks:  8  Duration in visits:  16  Discussed with:  Patient  Patient/caregiver understanding of therapy treatment and goals: Good understanding of therapy treatment and goals    Functional Assessment Used:  Upper Extremity Functional index=55/80        Referring provider co-signature:  I have reviewed this plan of care and my co-signature certifies the need for services.    Certification Period: 03/15/2023 to  05/10/23    Physician Signature: ________________________________ Date: ______________

## 2023-03-28 ENCOUNTER — APPOINTMENT (OUTPATIENT)
Dept: OCCUPATIONAL THERAPY | Facility: REHABILITATION | Age: 63
End: 2023-03-28
Payer: MEDICAID

## 2023-03-30 ENCOUNTER — OCCUPATIONAL THERAPY (OUTPATIENT)
Dept: OCCUPATIONAL THERAPY | Facility: REHABILITATION | Age: 63
End: 2023-03-30
Payer: MEDICAID

## 2023-03-30 DIAGNOSIS — S52.502A TRAUMATIC CLOSED DISPLACED FRACTURE OF DISTAL END OF LEFT RADIUS, INITIAL ENCOUNTER: ICD-10-CM

## 2023-03-30 PROCEDURE — 97110 THERAPEUTIC EXERCISES: CPT

## 2023-03-30 NOTE — OP THERAPY DAILY TREATMENT
Outpatient Occupational Therapy  DAILY TREATMENT     [facility] Occup[facility] [address].  [address]  [place] [address]  Phone:  [phone]  Fax:  [phone]    Date: 03/30/2023    Patient: Heri Solis  YOB: 1960  MRN: [id number]     Time Calculation  Start time: 0145  Stop time: 0230 Time Calculation (min): 45 minutes         Chief Complaint: Extremity Fracture and Self Care Duties    Visit #: 2    SUBJECTIVE:  I need to do my exercises more    OBJECTIVE:  Current objective measures:   Neurological Testing   Sensation   Wrist/Hand   Left   Intact: hot/cold discrimination  Diminished: light touch        Active Range of Motion      Right Wrist   Wrist flexion: 40 degrees   Wrist extension: 30 degrees   Wrist pronation: WFL  Wrist supination: 20 degrees   Radial deviation: 20 degrees   Ulnar deviation: 20 degrees      Right Thumb     Normal active range of motion     Right Digits   Normal active range of motion     Passive Range of Motion      Right Wrist   Wrist flexion: 55 degrees   Wrist extension: 50 degrees   Wrist pronation: WFL  Wrist supination: 30 degrees   Radial deviation: 20 degrees   Ulnar deviation: 20 degrees      Strength      Left Wrist/Hand       (2nd hand position)     Trial 1: 10     Right Wrist/Hand       (2nd hand position)     Trial 1: 85     Tests      Left Wrist/Hand   Negative Phalen's sign and Tinel's sign (median nerve).      General Comments      Wrist/Hand Comments  9 hole peg test:  R= 20 seconds  L = 30 seconds         Therapeutic Exercises (CPT 19475):     1. AROM and AAROM of left supination, wrist flexion and extension along with active movements of the hand    2. light resistance theraputty    3. isolated supination/pronation    4. wrist maze, 6 x    5. fine motor manipulation and dexterity    Therapeutic Exercise Summary:            Time-based treatments/modalities:  Therapeutic exercise minutes (CPT 42177): 45 minutes        Pain  rating before treatment: 0  Pain rating after treatment: 4    ASSESSMENT:   Response to treatment: Some improvement in ROM, strength and dexterity.  Patient admitted he has not been adhering to HEP on a daily basis and encouraged to do so.      PLAN/RECOMMENDATIONS:   Plan for treatment: therapy treatment to continue next visit.  Planned interventions for next visit: continue with current treatment and therapeutic exercise (CPT 03343)

## 2023-04-04 ENCOUNTER — APPOINTMENT (OUTPATIENT)
Dept: OCCUPATIONAL THERAPY | Facility: REHABILITATION | Age: 63
End: 2023-04-04
Payer: MEDICAID

## 2023-04-06 ENCOUNTER — OCCUPATIONAL THERAPY (OUTPATIENT)
Dept: OCCUPATIONAL THERAPY | Facility: REHABILITATION | Age: 63
End: 2023-04-06
Payer: MEDICAID

## 2023-04-06 DIAGNOSIS — S52.502A TRAUMATIC CLOSED DISPLACED FRACTURE OF DISTAL END OF LEFT RADIUS, INITIAL ENCOUNTER: ICD-10-CM

## 2023-04-06 PROCEDURE — 97110 THERAPEUTIC EXERCISES: CPT

## 2023-04-06 NOTE — OP THERAPY DAILY TREATMENT
Outpatient Occupational Therapy  DAILY TREATMENT     [facility] Occupational Therapy [address].  [address]  [place] [address]  Phone:  [phone]  Fax:  [phone]    Date: 04/06/2023    Patient: Heri Solis  YOB: 1960  MRN: [id number]     Time Calculation  Start time: 0145  Stop time: 0230 Time Calculation (min): 45 minutes         Chief Complaint: Extremity Fracture, Hand Weakness, and Hand Problem    Visit #: 3    SUBJECTIVE:  I am trying to do my exercises more     OBJECTIVE:  Current objective measures:   Neurological Testing   Sensation   Wrist/Hand   Left   Intact: hot/cold discrimination  Diminished: light touch        Active Range of Motion      Right Wrist   Wrist flexion: 50 degrees   Wrist extension: 46 degrees   Wrist pronation: WFL  Wrist supination: 20 degrees   Radial deviation: 20 degrees   Ulnar deviation: 20 degrees      Right Thumb     Normal active range of motion     Right Digits   Normal active range of motion     Passive Range of Motion      Right Wrist   Wrist flexion: 65 degrees   Wrist extension: 60 degrees   Wrist pronation: WFL  Wrist supination: 45 degrees   Radial deviation: 20 degrees   Ulnar deviation: 20 degrees      Strength      Left Wrist/Hand       (2nd hand position)     Trial 1: 18     Right Wrist/Hand       (2nd hand position)     Trial 1: 85     Tests      Left Wrist/Hand   Negative Phalen's sign and Tinel's sign (median nerve).      General Comments      Wrist/Hand Comments  9 hole peg test:  R= 20 seconds  L = 30 seconds         Therapeutic Exercises (CPT 01447):     1. AROM and AAROM of left supination, wrist flexion and extension along with active movements of the hand    2. medium resistance theraputty    3. isolated supination/pronation    4. wrist maze, 6 x    5. fine motor manipulation and dexterity    6. 9# digi-flex, 10 x    7. 11# graded clip with 3 point prehension, 10 x    8. flexion/extension exercise stick in  horizontal and vertical positions, 10 x each position    9. velcro roll with cylindrical grasp, 4 reps    Therapeutic Exercise Summary:  Issued medium resistance putty and added to HEP          Time-based treatments/modalities:  Therapeutic exercise minutes (CPT 50216): 45 minutes        Pain rating before treatment: 1  Pain rating after treatment: 5    ASSESSMENT:   Response to treatment: increased AROM, PROM and  strength noted; however still quite limited and would benefit with continued OT intervention to further enhance functional use of left upper extremity.     PLAN/RECOMMENDATIONS:   Plan for treatment: therapy treatment to continue next visit.  Planned interventions for next visit: continue with current treatment and therapeutic exercise (CPT 21894)

## 2023-04-11 ENCOUNTER — OCCUPATIONAL THERAPY (OUTPATIENT)
Dept: OCCUPATIONAL THERAPY | Facility: REHABILITATION | Age: 63
End: 2023-04-11
Payer: MEDICAID

## 2023-04-11 DIAGNOSIS — S52.502A TRAUMATIC CLOSED DISPLACED FRACTURE OF DISTAL END OF LEFT RADIUS, INITIAL ENCOUNTER: ICD-10-CM

## 2023-04-11 PROCEDURE — 97110 THERAPEUTIC EXERCISES: CPT

## 2023-04-11 NOTE — OP THERAPY DAILY TREATMENT
Outpatient Occupational Therapy  DAILY TREATMENT     [facility] Occupational Therapy [address].  [address]  [place] [address]  Phone:  [phone]  Fax:  [phone]    Date: 04/11/2023    Patient: Heri Solis  YOB: 1960  MRN: [id number]     Time Calculation  Start time: 0145  Stop time: 0230 Time Calculation (min): 45 minutes         Chief Complaint: Extremity Fracture    Visit #: 4    SUBJECTIVE:  I think my arm is moving a bit more    OBJECTIVE:  Current objective measures:   Sensation   Wrist/Hand   Left   Intact: hot/cold discrimination  Diminished: light touch        Active Range of Motion      Right Wrist   Wrist flexion: 56 degrees   Wrist extension: 51 degrees   Wrist pronation: WFL  Wrist supination: 30 degrees   Radial deviation: 20 degrees   Ulnar deviation: 20 degrees      Right Thumb     Normal active range of motion     Right Digits   Normal active range of motion     Passive Range of Motion      Right Wrist   Wrist flexion: 75 degrees   Wrist extension: 65 degrees   Wrist pronation: WFL  Wrist supination: 50 degrees   Radial deviation: 20 degrees   Ulnar deviation: 20 degrees      Strength      Left Wrist/Hand       (2nd hand position)     Trial 1: 18     Right Wrist/Hand       (2nd hand position)     Trial 1: 85     Tests      Left Wrist/Hand   Negative Phalen's sign and Tinel's sign (median nerve).      General Comments      Wrist/Hand Comments  9 hole peg test:  R= 20 seconds  L = 30 seconds         Therapeutic Exercises (CPT 07303):     1. AROM and AAROM of left supination, wrist flexion and extension along with active movements of the hand    2. medium resistance theraputty    3. isolated supination/pronation    4. wrist maze, 6 x    5. fine motor manipulation and dexterity    6. 9# digi-flex, 10 x    7. 11# graded clip with 3 point prehension, 10 x    8. flexion/extension exercise stick in horizontal and vertical positions, 10 x each position    9.  velcro roll with cylindrical grasp, 4 reps    10. isolated strengthening of wrist extension/flexion and RD, 2 x 10 with 2# hand weight.    Therapeutic Exercise Summary:            Time-based treatments/modalities:  Therapeutic exercise minutes (CPT 09073): 45 minutes        Pain rating before treatment: 2  Pain rating after treatment: 5    ASSESSMENT:   Response to treatment: increased AROM and PROM noted today.  Strength remains status quo    PLAN/RECOMMENDATIONS:   Plan for treatment: therapy treatment to continue next visit.  Planned interventions for next visit: continue with current treatment and therapeutic exercise (CPT 72852)

## 2023-04-13 ENCOUNTER — OCCUPATIONAL THERAPY (OUTPATIENT)
Dept: OCCUPATIONAL THERAPY | Facility: REHABILITATION | Age: 63
End: 2023-04-13
Payer: MEDICAID

## 2023-04-13 DIAGNOSIS — S52.502A TRAUMATIC CLOSED DISPLACED FRACTURE OF DISTAL END OF LEFT RADIUS, INITIAL ENCOUNTER: ICD-10-CM

## 2023-04-13 PROCEDURE — 97110 THERAPEUTIC EXERCISES: CPT

## 2023-04-13 NOTE — OP THERAPY DAILY TREATMENT
Outpatient Occupational Therapy  DAILY TREATMENT     [facility] Occupational Therapy [address].  [address]  [place] [address]  Phone:  [phone]  Fax:  [phone]    Date: 04/13/2023    Patient: Heri Solis  YOB: 1960  MRN: [id number]     Time Calculation  Start time: 0845  Stop time: 0930 Time Calculation (min): 45 minutes         Chief Complaint: Extremity Weakness    Visit #: 5    SUBJECTIVE:  I am trying to use my left hand as much as possible.  I can zip up my jacket carrie    OBJECTIVE:  Current objective measures:   Sensation   Wrist/Hand   Left   Intact: hot/cold discrimination  Diminished: light touch        Active Range of Motion      Right Wrist   Wrist flexion: 56 degrees   Wrist extension: 51 degrees   Wrist pronation: WFL  Wrist supination: 45 degrees   Radial deviation: 20 degrees   Ulnar deviation: 20 degrees      Right Thumb     Normal active range of motion     Right Digits   Normal active range of motion     Passive Range of Motion      Right Wrist   Wrist flexion: 75 degrees   Wrist extension: 65 degrees   Wrist pronation: WFL  Wrist supination: 60 degrees   Radial deviation: 20 degrees   Ulnar deviation: 20 degrees      Strength      Left Wrist/Hand       (2nd hand position)     Trial 1: 18     Right Wrist/Hand       (2nd hand position)     Trial 1: 85     Tests      Left Wrist/Hand   Negative Phalen's sign and Tinel's sign (median nerve).      General Comments      Wrist/Hand Comments  9 hole peg test:  R= 20 seconds  L = 30 seconds         Therapeutic Exercises (CPT 67225):     1. AROM and AAROM of left supination, wrist flexion and extension along with active movements of the hand    2. medium resistance theraputty    3. isolated supination/pronation    4. wrist maze, 6 x    5. fine motor manipulation and dexterity    6. 9# digi-flex, 10 x    7. 11# graded clip with 3 point prehension, 10 x    Therapeutic Exercise Summary:            Time-based  treatments/modalities:  Therapeutic exercise minutes (CPT 58789): 45 minutes        Pain rating before treatment: 1  Pain rating after treatment: 5    ASSESSMENT:   Response to treatment: Progressing well and utilizing L UE more during functional tasks. Improving supination     PLAN/RECOMMENDATIONS:   Plan for treatment: therapy treatment to continue next visit.  Planned interventions for next visit: continue with current treatment and therapeutic exercise (CPT 91295)

## 2023-04-17 ENCOUNTER — OCCUPATIONAL THERAPY (OUTPATIENT)
Dept: OCCUPATIONAL THERAPY | Facility: REHABILITATION | Age: 63
End: 2023-04-17
Payer: MEDICAID

## 2023-04-17 DIAGNOSIS — S52.502A TRAUMATIC CLOSED DISPLACED FRACTURE OF DISTAL END OF LEFT RADIUS, INITIAL ENCOUNTER: ICD-10-CM

## 2023-04-17 PROCEDURE — 97110 THERAPEUTIC EXERCISES: CPT

## 2023-04-17 NOTE — OP THERAPY DAILY TREATMENT
Outpatient Occupational Therapy  DAILY TREATMENT     [facility] Occupational Therapy [address].  [address]  [place] [address]  Phone:  [phone]  Fax:  [phone]    Date: 04/17/2023    Patient: Heri Solis  YOB: 1960  MRN: [id number]     Time Calculation  Start time: 0315  Stop time: 0400 Time Calculation (min): 45 minutes         Chief Complaint: Extremity Fracture and Self Care Duties    Visit #: 6    SUBJECTIVE:  I am using my left hand a lot more at home    OBJECTIVE:  Current objective measures:   Active Range of Motion      Right Wrist   Wrist flexion: 58 degrees   Wrist extension: 51 degrees   Wrist pronation: WFL  Wrist supination: 45 degrees   Radial deviation: 20 degrees   Ulnar deviation: 20 degrees      Right Thumb     Normal active range of motion     Right Digits   Normal active range of motion     Passive Range of Motion      Right Wrist   Wrist flexion: 75 degrees   Wrist extension: 70 degrees   Wrist pronation: WFL  Wrist supination: 70 degrees   Radial deviation: 20 degrees   Ulnar deviation: 20 degrees      Strength      Left Wrist/Hand       (2nd hand position)     Trial 1: 24     Right Wrist/Hand       (2nd hand position)     Trial 1: 85     Tests      Left Wrist/Hand   Negative Phalen's sign and Tinel's sign (median nerve).      General Comments      Wrist/Hand Comments  9 hole peg test:  R= 20 seconds  L = 30 seconds         Therapeutic Exercises (CPT 77103):     1. AROM and AAROM of left supination, wrist flexion and extension along with active movements of the hand    2. medium resistance theraputty    3. isolated supination/pronation, 10 x    4. wrist maze, 6 x    5. fine motor manipulation and dexterity    6. 9# digi-flex, 10 x    7. 11# graded clip with 3 point prehension, 10 x    8. velcro roll with cylndrical grasp, 5 reps    Therapeutic Exercise Summary:            Time-based treatments/modalities:  Therapeutic exercise minutes (CPT  91118): 45 minutes        Pain rating before treatment: 1  Pain rating after treatment: 2    ASSESSMENT:   Response to treatment: Some improvement with AROM, PROM and increase of 6 pounds in  strength.      PLAN/RECOMMENDATIONS:   Plan for treatment: therapy treatment to continue next visit.  Planned interventions for next visit: continue with current treatment and therapeutic exercise (CPT 72072)

## 2023-04-20 ENCOUNTER — OCCUPATIONAL THERAPY (OUTPATIENT)
Dept: OCCUPATIONAL THERAPY | Facility: REHABILITATION | Age: 63
End: 2023-04-20
Payer: MEDICAID

## 2023-04-20 DIAGNOSIS — S52.502A TRAUMATIC CLOSED DISPLACED FRACTURE OF DISTAL END OF LEFT RADIUS, INITIAL ENCOUNTER: ICD-10-CM

## 2023-04-20 PROCEDURE — 97110 THERAPEUTIC EXERCISES: CPT

## 2023-04-20 NOTE — OP THERAPY DAILY TREATMENT
Outpatient Occupational Therapy  DAILY TREATMENT     [facility] Occupational Therapy [address].  [address]  [place] [address]  Phone:  [phone]  Fax:  [phone]    Date: 04/20/2023    Patient: Heri Solis  YOB: 1960  MRN: [id number]     Time Calculation  Start time: 0145  Stop time: 0230 Time Calculation (min): 45 minutes         Chief Complaint: Extremity Fracture, Self Care Duties, and Hand Weakness    Visit #: 7    SUBJECTIVE:  I need to do more of my exercises at home.      OBJECTIVE:  Current objective measures:   Active Range of Motion      Right Wrist   Wrist flexion: 60 degrees   Wrist extension: 51 degrees   Wrist pronation: WFL  Wrist supination: 45 degrees   Radial deviation: 20 degrees   Ulnar deviation: 20 degrees      Right Thumb     Normal active range of motion     Right Digits   Normal active range of motion     Passive Range of Motion      Right Wrist   Wrist flexion: 75 degrees   Wrist extension: 70 degrees   Wrist pronation: WFL  Wrist supination: 70 degrees   Radial deviation: 20 degrees   Ulnar deviation: 20 degrees      Strength      Left Wrist/Hand       (2nd hand position)     Trial 1: 29     Right Wrist/Hand       (2nd hand position)     Trial 1: 85     Tests      Left Wrist/Hand   Negative Phalen's sign and Tinel's sign (median nerve).      General Comments      Wrist/Hand Comments  9 hole peg test:  R= 20 seconds  L = 24 seconds         Therapeutic Exercises (CPT 10031):     1. AROM and AAROM of left supination, wrist flexion and extension along with active movements of the hand    2. medium resistance theraputty    3. isolated supination/pronation, 10 x    4. wrist maze, 6 x    5. fine motor manipulation and dexterity    6. 9# digi-flex, 10 x    7. 11# graded clip with 3 point prehension, 10 x    8. velcro roll with cylndrical grasp, 5 reps    Therapeutic Exercise Summary:            Time-based treatments/modalities:  Therapeutic exercise  minutes (CPT 92635): 45 minutes        Pain rating before treatment: 2  Pain rating after treatment: 4    ASSESSMENT:   Response to treatment: Continues to improve with AROM,  strength and dexterity.  Progress note completed.      PLAN/RECOMMENDATIONS:   Plan for treatment: therapy treatment to continue next visit.  Planned interventions for next visit: continue with current treatment and therapeutic exercise (CPT 93729)

## 2023-04-20 NOTE — OP THERAPY PROGRESS SUMMARY
Outpatient Occupational Therapy  PROGRESS SUMMARY NOTE    [facility] Occupational Therapy [address].  [address]  [place] [address]  Phone:  [phone]  Fax:  [phone]    Date of Visit: 04/20/2023    Patient: Heri Solis  YOB: 1960  MRN: [id number]     Referring Provider: Ashlee Whatley P.A.-C.  [address] [place] [address]   Referring Diagnosis Unspecified fracture of the lower end of left radius, initial encounter for closed fracture [S52.502A]     Visit Diagnoses     ICD-10-CM   1. Traumatic closed displaced fracture of distal end of left radius, initial encounter  S52.502A       Rehab Potential: excellent    Progress Report Period: 3/15/23-4/20/23    Functional Assessment Used:  UEFI = 63/80          Objective Findings and Assessment:   Patient progression towards goals: Patient has been actively attending OT 2 x a week for hand therapy s/p ORIF of left distal radius fracture.  Patient progressing well with therapy with improved ROM, strength and dexterity; however continues to be quite limited.  Patient would benefit with continued OT intervention to further enhance functional use of left non-dominant arm.      Objective findings and assessment details: Current objective measures:   Active Range of Motion      Right Wrist   Wrist flexion: 60 degrees   Wrist extension: 51 degrees   Wrist pronation: WFL  Wrist supination: 45 degrees   Radial deviation: 20 degrees   Ulnar deviation: 20 degrees      Right Thumb     Normal active range of motion     Right Digits   Normal active range of motion     Passive Range of Motion      Right Wrist   Wrist flexion: 75 degrees   Wrist extension: 70 degrees   Wrist pronation: WFL  Wrist supination: 70 degrees   Radial deviation: 20 degrees   Ulnar deviation: 20 degrees      Strength      Left Wrist/Hand       (2nd hand position)     Trial 1: 29     Right Wrist/Hand       (2nd hand position)     Trial 1: 85     Tests       Left Wrist/Hand   Negative Phalen's sign and Tinel's sign (median nerve).      General Comments      Wrist/Hand Comments  9 hole peg test:  R= 20 seconds  L = 24 seconds     Goals:   Short Term Goals:   Patient will have full active wrist flexion, RD/UD and supination for improved function of left UE  Patient will achieve at least 55 degrees of wrist extension to enhance functional use of left UE.  Patient will have at least 50 pounds  strength to enhance functional use of left UE  Patient will score at least 65/80 on the UEFI  Short term goal timespan:  2-4 weeks    Long Term Goals:   Patient will achieve at least 60 degrees of wrist extension to enhance functional use of left UE.  Patient will have at least 60 pounds  strength to enhance functional use of left UE  Patient will score at least 70/80 on the UEFI  Long term goal timespan:  6-8 weeks    Plan:   Planned therapy interventions:  Therapeutic Exercise (CPT 50499)  Other planned therapy interventions:  97110 x 3  Frequency:  2x week  Duration in weeks:  6  Duration in visits:  12    Referring provider co-signature:  I have reviewed this plan of care and my co-signature certifies the need for services.    Certification Period: 04/20/2023 to 06/01/23    Physician Signature: ________________________________ Date: ______________

## 2023-04-24 ENCOUNTER — OCCUPATIONAL THERAPY (OUTPATIENT)
Dept: OCCUPATIONAL THERAPY | Facility: REHABILITATION | Age: 63
End: 2023-04-24
Payer: MEDICAID

## 2023-04-24 DIAGNOSIS — S52.502A TRAUMATIC CLOSED DISPLACED FRACTURE OF DISTAL END OF LEFT RADIUS, INITIAL ENCOUNTER: ICD-10-CM

## 2023-04-24 PROCEDURE — 97110 THERAPEUTIC EXERCISES: CPT

## 2023-04-24 NOTE — OP THERAPY DAILY TREATMENT
Outpatient Occupational Therapy  DAILY TREATMENT     [facility] Occupational Therapy [address].  [address]  [place] [address]  Phone:  [phone]  Fax:  [phone]    Date: 04/24/2023    Patient: Heri Solis  YOB: 1960  MRN: [id number]     Time Calculation  Start time: 0315  Stop time: 0400 Time Calculation (min): 45 minutes         Chief Complaint: Extremity Fracture and Self Care Duties    Visit #: 8    SUBJECTIVE:  I rode my bike this weekend.  It wasn't too bad.      OBJECTIVE:  Current objective measures: Active Range of Motion      Right Wrist   Wrist flexion: 65 degrees   Wrist extension: 55 degrees   Wrist pronation: WFL  Wrist supination: 50 degrees   Radial deviation: 20 degrees   Ulnar deviation: 20 degrees      Right Thumb     Normal active range of motion     Right Digits   Normal active range of motion     Passive Range of Motion      Right Wrist   Wrist flexion: 75 degrees   Wrist extension: 72 degrees   Wrist pronation: WFL  Wrist supination: 70 degrees   Radial deviation: 20 degrees   Ulnar deviation: 20 degrees      Strength      Left Wrist/Hand       (2nd hand position)     Trial 1: 29     Right Wrist/Hand       (2nd hand position)     Trial 1: 85     Tests      Left Wrist/Hand   Negative Phalen's sign and Tinel's sign (median nerve).      General Comments      Wrist/Hand Comments  9 hole peg test:  R= 20 seconds  L = 24 seconds         Therapeutic Exercises (CPT 67453):     1. AROM and AAROM of left supination, wrist flexion and extension along with active movements of the hand    2. medium resistance theraputty    3. isolated supination/pronation, 10 x    4. wrist maze, 6 x    5. fine motor manipulation and dexterity    6. 9# digi-flex, 10 x    7. 11# graded clip with 3 point prehension, 10 x    8. velcro roll with cylndrical grasp, 5 reps    Therapeutic Exercise Summary:            Time-based treatments/modalities:  Therapeutic exercise minutes (CPT  59512): 45 minutes        Pain rating before treatment: 1  Pain rating after treatment: 3    ASSESSMENT:   Response to treatment: Continues to improve with AROM and PROM.   strength remains status quo    PLAN/RECOMMENDATIONS:   Plan for treatment: therapy treatment to continue next visit.  Planned interventions for next visit: continue with current treatment and therapeutic exercise (CPT 67430)

## 2023-04-28 ENCOUNTER — OCCUPATIONAL THERAPY (OUTPATIENT)
Dept: OCCUPATIONAL THERAPY | Facility: REHABILITATION | Age: 63
End: 2023-04-28
Payer: MEDICAID

## 2023-04-28 DIAGNOSIS — S52.502A TRAUMATIC CLOSED DISPLACED FRACTURE OF DISTAL END OF LEFT RADIUS, INITIAL ENCOUNTER: ICD-10-CM

## 2023-04-28 PROCEDURE — 97110 THERAPEUTIC EXERCISES: CPT

## 2023-04-28 NOTE — OP THERAPY DAILY TREATMENT
Outpatient Occupational Therapy  DAILY TREATMENT     [facility] Occupational Therapy [address].  [address]  [place] [address]  Phone:  [phone]  Fax:  [phone]    Date: 04/28/2023    Patient: Heri Solis  YOB: 1960  MRN: [id number]     Time Calculation  Start time: 0100  Stop time: 0145 Time Calculation (min): 45 minutes         Chief Complaint: Extremity Fracture and Self Care Duties    Visit #: 9    SUBJECTIVE:  I have been working really hard on this arm    OBJECTIVE:  Current objective measures:      Right Wrist   Wrist flexion: 70 degrees   Wrist extension: 60 degrees   Wrist pronation: WFL  Wrist supination: 75 degrees   Radial deviation: 20 degrees   Ulnar deviation: 20 degrees      Right Thumb     Normal active range of motion     Right Digits   Normal active range of motion     Passive Range of Motion      Right Wrist   Wrist flexion: 80 degrees   Wrist extension: 75 degrees   Wrist pronation: WFL  Wrist supination: 70 degrees   Radial deviation: 20 degrees   Ulnar deviation: 20 degrees      Strength      Left Wrist/Hand       (2nd hand position)     Trial 1: 34     Right Wrist/Hand       (2nd hand position)     Trial 1: 85     Tests      Left Wrist/Hand   Negative Phalen's sign and Tinel's sign (median nerve).      General Comments      Wrist/Hand Comments  9 hole peg test:  R= 20 seconds  L = 24 seconds         Therapeutic Exercises (CPT 97695):     1. AROM and AAROM of left supination, wrist flexion and extension along with active movements of the hand    2. medium resistance theraputty    3. isolated supination/pronation, 10 x    4. wrist maze, 6 x    5. fine motor manipulation and dexterity    6. 9# digi-flex, 10 x    7. 11# graded clip with 3 point prehension, 10 x    8. velcro roll with cylndrical grasp, 5 reps    Therapeutic Exercise Summary:            Time-based treatments/modalities:  Therapeutic exercise minutes (CPT 37069): 45 minutes        Pain  rating before treatment: 1  Pain rating after treatment: 2    ASSESSMENT:   Response to treatment: Continues to improve with AROM/PROM and  strength in left hand/wrist.      PLAN/RECOMMENDATIONS:   Plan for treatment: therapy treatment to continue next visit.  Planned interventions for next visit: continue with current treatment and therapeutic exercise (CPT 28256)

## 2023-05-02 ENCOUNTER — APPOINTMENT (OUTPATIENT)
Dept: OCCUPATIONAL THERAPY | Facility: REHABILITATION | Age: 63
End: 2023-05-02
Payer: MEDICAID

## 2023-05-04 ENCOUNTER — OCCUPATIONAL THERAPY (OUTPATIENT)
Dept: OCCUPATIONAL THERAPY | Facility: REHABILITATION | Age: 63
End: 2023-05-04
Payer: MEDICAID

## 2023-05-04 DIAGNOSIS — S52.502A TRAUMATIC CLOSED DISPLACED FRACTURE OF DISTAL END OF LEFT RADIUS, INITIAL ENCOUNTER: ICD-10-CM

## 2023-05-04 PROCEDURE — 97110 THERAPEUTIC EXERCISES: CPT

## 2023-05-08 ENCOUNTER — OCCUPATIONAL THERAPY (OUTPATIENT)
Dept: OCCUPATIONAL THERAPY | Facility: REHABILITATION | Age: 63
End: 2023-05-08
Payer: MEDICAID

## 2023-05-08 DIAGNOSIS — S52.502A TRAUMATIC CLOSED DISPLACED FRACTURE OF DISTAL END OF LEFT RADIUS, INITIAL ENCOUNTER: ICD-10-CM

## 2023-05-08 PROCEDURE — 97110 THERAPEUTIC EXERCISES: CPT

## 2023-05-08 NOTE — OP THERAPY DAILY TREATMENT
Outpatient Occupational Therapy  DAILY TREATMENT     [facility] Occupational Therapy [address].  [address]  [place] [address]  Phone:  [phone]  Fax:  [phone]    Date: 05/08/2023    Patient: Heri Solis  YOB: 1960  MRN: [id number]     Time Calculation  Start time: 0230  Stop time: 0315 Time Calculation (min): 45 minutes         Chief Complaint: Extremity Fracture    Visit #: 11    SUBJECTIVE:  No issues noted since last visit.     OBJECTIVE:  Current objective measures:   Right Wrist   Wrist flexion: 75 degrees   Wrist extension: 65 degrees   Wrist pronation: WFL  Wrist supination: 75 degrees   Radial deviation: 20 degrees   Ulnar deviation: 20 degrees      Right Thumb     Normal active range of motion     Right Digits   Normal active range of motion     Passive Range of Motion      Right Wrist   Wrist flexion: 80 degrees   Wrist extension: 75 degrees   Wrist pronation: WFL  Wrist supination: 70 degrees   Radial deviation: 20 degrees   Ulnar deviation: 20 degrees      Strength      Left Wrist/Hand       (2nd hand position)     Trial 1: 44     Right Wrist/Hand       (2nd hand position)     Trial 1: 85     Tests      Left Wrist/Hand   Negative Phalen's sign and Tinel's sign (median nerve).      General Comments      Wrist/Hand Comments  9 hole peg test:  R= 20 seconds  L = 24 seconds         Therapeutic Exercises (CPT 15072):     1. AROM and AAROM of left supination, wrist flexion and extension along with active movements of the hand    2. medium resistance theraputty    3. isolated supination/pronation, 10 x    4. wrist maze, 6 x    5. fine motor manipulation and dexterity    6. 9# digi-flex, 10 x    7. 11# graded clip with 3 point prehension, 10 x    8. velcro roll with cylndrical grasp, 5 reps    Therapeutic Exercise Summary:            Time-based treatments/modalities:  Therapeutic exercise minutes (CPT 43917): 45 minutes        Pain rating before treatment:  2  Pain rating after treatment: 3    ASSESSMENT:   Response to treatment: 10 pound improvement with  strength in left hand    PLAN/RECOMMENDATIONS:   Plan for treatment: therapy treatment to continue next visit.  Planned interventions for next visit: continue with current treatment and therapeutic exercise (CPT 46932)

## 2023-05-10 ENCOUNTER — OCCUPATIONAL THERAPY (OUTPATIENT)
Dept: OCCUPATIONAL THERAPY | Facility: REHABILITATION | Age: 63
End: 2023-05-10
Payer: MEDICAID

## 2023-05-10 DIAGNOSIS — S52.502A TRAUMATIC CLOSED DISPLACED FRACTURE OF DISTAL END OF LEFT RADIUS, INITIAL ENCOUNTER: ICD-10-CM

## 2023-05-10 PROCEDURE — 97110 THERAPEUTIC EXERCISES: CPT

## 2023-05-10 NOTE — OP THERAPY DAILY TREATMENT
Outpatient Occupational Therapy  DAILY TREATMENT     [facility] Occupational Therapy [address].  [address]  [place] [address]  Phone:  [phone]  Fax:  [phone]    Date: 05/10/2023    Patient: Heri Solis  YOB: 1960  MRN: [id number]     Time Calculation  Start time: 0230  Stop time: 0315 Time Calculation (min): 45 minutes         Chief Complaint: Extremity Fracture    Visit #: 12    SUBJECTIVE:  No issues noted since last visit.    OBJECTIVE:  Current objective measures:   Active Range of Motion  Right Wrist   Wrist flexion: 75 degrees   Wrist extension: 65 degrees   Wrist pronation: WFL  Wrist supination: 75 degrees   Radial deviation: 20 degrees   Ulnar deviation: 20 degrees      Right Thumb     Normal active range of motion     Right Digits   Normal active range of motion     Passive Range of Motion      Right Wrist   Wrist flexion: 80 degrees   Wrist extension: 75 degrees   Wrist pronation: WFL  Wrist supination: 70 degrees   Radial deviation: 20 degrees   Ulnar deviation: 20 degrees      Strength      Left Wrist/Hand       (2nd hand position)     Trial 1: 44     Right Wrist/Hand       (2nd hand position)     Trial 1: 85     Tests      Left Wrist/Hand   Negative Phalen's sign and Tinel's sign (median nerve).      General Comments      Wrist/Hand Comments  9 hole peg test:  R= 20 seconds  L = 24 seconds         Therapeutic Exercises (CPT 76431):     1. AROM and AAROM of left supination, wrist flexion and extension along with active movements of the hand    2. medium resistance theraputty    3. isolated supination/pronation, 10 x    4. wrist maze, 6 x    5. fine motor manipulation and dexterity    6. 9# digi-flex, 10 x    7. 11# graded clip with 3 point prehension, 10 x    8. velcro roll with cylndrical grasp, 5 reps    9. Isolated wrist strengthening in flexion/extension and RD, 3 sets of 10 for each exercise.    Therapeutic Exercise Summary:              Time-based  treatments/modalities:  Therapeutic exercise minutes (CPT 62700): 45 minutes        Pain rating before treatment: 1  Pain rating after treatment: 1    ASSESSMENT:   Response to treatment: Progressing well in therapy and working towards set goals    PLAN/RECOMMENDATIONS:   Plan for treatment: therapy treatment to continue next visit.  Planned interventions for next visit: continue with current treatment and therapeutic exercise (CPT 75181)

## 2023-05-16 ENCOUNTER — OCCUPATIONAL THERAPY (OUTPATIENT)
Dept: OCCUPATIONAL THERAPY | Facility: REHABILITATION | Age: 63
End: 2023-05-16
Payer: MEDICAID

## 2023-05-16 DIAGNOSIS — S52.502A TRAUMATIC CLOSED DISPLACED FRACTURE OF DISTAL END OF LEFT RADIUS, INITIAL ENCOUNTER: ICD-10-CM

## 2023-05-16 PROCEDURE — 97110 THERAPEUTIC EXERCISES: CPT

## 2023-05-16 NOTE — OP THERAPY DAILY TREATMENT
Outpatient Occupational Therapy  DAILY TREATMENT     [facility] Occupational Therapy [address].  [address]  [place] [address]  Phone:  [phone]  Fax:  [phone]    Date: 05/16/2023    Patient: Heri Solis  YOB: 1960  MRN: [id number]     Time Calculation  Start time: 0230  Stop time: 0315 Time Calculation (min): 45 minutes         Chief Complaint: Extremity Fracture and Self Care Duties    Visit #: 13    SUBJECTIVE:  I definitely feel I am getting more use of this left arm.      OBJECTIVE:  Current objective measures:   Active Range of Motion  Right Wrist   Wrist flexion: 75 degrees   Wrist extension: 70 degrees   Wrist pronation: WFL  Wrist supination: 75 degrees   Radial deviation: 20 degrees   Ulnar deviation: 20 degrees      Right Thumb     Normal active range of motion     Right Digits   Normal active range of motion     Passive Range of Motion      Right Wrist   Wrist flexion: 80 degrees   Wrist extension: 75 degrees   Wrist pronation: WFL  Wrist supination: 70 degrees   Radial deviation: 20 degrees   Ulnar deviation: 20 degrees      Strength      Left Wrist/Hand       (2nd hand position)     Trial 1: 47     Right Wrist/Hand       (2nd hand position)     Trial 1: 85     Tests      Left Wrist/Hand   Negative Phalen's sign and Tinel's sign (median nerve).      General Comments      Wrist/Hand Comments  9 hole peg test:  R= 20 seconds  L = 24 seconds         Therapeutic Exercises (CPT 22273):     1. AROM and AAROM of left supination, wrist flexion and extension along with active movements of the hand    2. medium resistance theraputty    3. isolated supination/pronation, 10 x    4. wrist maze, 6 x    5. fine motor manipulation and dexterity    6. 9# digi-flex, 10 x    7. 11# graded clip with 3 point prehension, 10 x    8. velcro roll with cylndrical grasp, 5 reps    9. Isolated wrist strengthening in flexion/extension and RD, 3 sets of 10 for each exercise with 3#  hand weight    Therapeutic Exercise Summary:              Time-based treatments/modalities:  Therapeutic exercise minutes (CPT 45705): 45 minutes        Pain rating before treatment: 2  Pain rating after treatment: 4    ASSESSMENT:   Response to treatment: improved active wrist extension and improved  strength of 3 pounds.      PLAN/RECOMMENDATIONS:   Plan for treatment: therapy treatment to continue next visit.  Planned interventions for next visit: continue with current treatment and therapeutic exercise (CPT 94628)

## 2023-05-18 ENCOUNTER — OCCUPATIONAL THERAPY (OUTPATIENT)
Dept: OCCUPATIONAL THERAPY | Facility: REHABILITATION | Age: 63
End: 2023-05-18
Payer: MEDICAID

## 2023-05-18 DIAGNOSIS — S52.502A TRAUMATIC CLOSED DISPLACED FRACTURE OF DISTAL END OF LEFT RADIUS, INITIAL ENCOUNTER: ICD-10-CM

## 2023-05-18 PROCEDURE — 97110 THERAPEUTIC EXERCISES: CPT

## 2023-05-18 NOTE — OP THERAPY DAILY TREATMENT
Outpatient Occupational Therapy  DAILY TREATMENT     [facility] Occupational Therapy [address].  [address]  [place] [address]  Phone:  [phone]  Fax:  [phone]    Date: 05/18/2023    Patient: Heri Solis  YOB: 1960  MRN: [id number]     Time Calculation  Start time: 0100  Stop time: 0145 Time Calculation (min): 45 minutes         Chief Complaint: Extremity Fracture    Visit #: 14    SUBJECTIVE:  No issues noted since last visit.      OBJECTIVE:  Current objective measures:   Active Range of Motion  Right Wrist   Wrist flexion: 75 degrees   Wrist extension: 70 degrees   Wrist pronation: WFL  Wrist supination: 75 degrees   Radial deviation: 20 degrees   Ulnar deviation: 20 degrees      Right Thumb     Normal active range of motion     Right Digits   Normal active range of motion     Passive Range of Motion      Right Wrist   Wrist flexion: 80 degrees   Wrist extension: 75 degrees   Wrist pronation: WFL  Wrist supination: 70 degrees   Radial deviation: 20 degrees   Ulnar deviation: 20 degrees      Strength      Left Wrist/Hand       (2nd hand position)     Trial 1: 47     Right Wrist/Hand       (2nd hand position)     Trial 1: 85     Tests      Left Wrist/Hand   Negative Phalen's sign and Tinel's sign (median nerve).      General Comments      Wrist/Hand Comments  9 hole peg test:  R= 20 seconds  L = 24 seconds         Therapeutic Exercises (CPT 07501):     1. AROM and AAROM of left supination, wrist flexion and extension along with active movements of the hand    2. medium resistance theraputty    3. isolated supination/pronation, 10 x    4. wrist maze, 6 x    5. fine motor manipulation and dexterity    6. 9# digi-flex, 10 x    7. 11# graded clip with 3 point prehension, 10 x    8. velcro roll with cylndrical grasp, 5 reps    9. Isolated wrist strengthening in flexion/extension and RD, 3 sets of 10 for each exercise with 3# hand weight    Therapeutic Exercise Summary:               Time-based treatments/modalities:  Therapeutic exercise minutes (CPT 22472): 45 minutes        Pain rating before treatment: 1  Pain rating after treatment: 1    ASSESSMENT:   Response to treatment: Highly motivated to participate in therapy and adhering better to HEP.      PLAN/RECOMMENDATIONS:   Plan for treatment: therapy treatment to continue next visit.  Planned interventions for next visit: continue with current treatment and therapeutic exercise (CPT 55330)

## 2023-05-24 ENCOUNTER — OCCUPATIONAL THERAPY (OUTPATIENT)
Dept: OCCUPATIONAL THERAPY | Facility: REHABILITATION | Age: 63
End: 2023-05-24
Payer: MEDICAID

## 2023-05-24 DIAGNOSIS — S52.502A TRAUMATIC CLOSED DISPLACED FRACTURE OF DISTAL END OF LEFT RADIUS, INITIAL ENCOUNTER: ICD-10-CM

## 2023-05-24 PROCEDURE — 97110 THERAPEUTIC EXERCISES: CPT

## 2023-05-24 NOTE — OP THERAPY PROGRESS SUMMARY
Outpatient Occupational Therapy  PROGRESS SUMMARY NOTE    [facility] Occupational Therapy [address].  [address]  [place] [address]  Phone:  [phone]  Fax:  [phone]    Date of Visit: 05/24/2023    Patient: Heri Solis  YOB: 1960  MRN: [id number]     Referring Provider: Ashlee Whatley P.A.-C.  [address] [place] [address]   Referring Diagnosis Unspecified fracture of the lower end of left radius, initial encounter for closed fracture [S52.502A]     Visit Diagnoses     ICD-10-CM   1. Traumatic closed displaced fracture of distal end of left radius, initial encounter  S52.502A       Rehab Potential: excellent    Progress Report Period: 4/20-5/24/23    Functional Assessment Used:  UEFI=68/80          Objective Findings and Assessment:   Patient progression towards goals: Patient has been actively attending OT 2 x a week and continues to progress with therapy.  Patient working towards meeting long term goals and would benefit from continued OT intervention to further enhance ROM, strength and functional use of left non-dominant arm.      Objective findings and assessment details: Active Range of Motion  Right Wrist   Wrist flexion: 75 degrees   Wrist extension: 70 degrees   Wrist pronation: WFL  Wrist supination: 75 degrees   Radial deviation: 20 degrees   Ulnar deviation: 20 degrees      Right Thumb     Normal active range of motion     Right Digits   Normal active range of motion     Passive Range of Motion      Right Wrist   Wrist flexion: 80 degrees   Wrist extension: 75 degrees   Wrist pronation: WFL  Wrist supination: 70 degrees   Radial deviation: 20 degrees   Ulnar deviation: 20 degrees      Strength      Left Wrist/Hand       (2nd hand position)     Trial 1: 48     Right Wrist/Hand       (2nd hand position)     Trial 1: 85     Tests      Left Wrist/Hand   Negative Phalen's sign and Tinel's sign (median nerve).      General Comments      Wrist/Hand  Comments  9 hole peg test:  R= 20 seconds  L = 22 seconds     Goals:   Short Term Goals:   Patient will have full active wrist flexion for improved function of left UE  Patient will achieve at least 75 degrees of wrist extension to enhance functional use of left UE.  Patient will have at least 50 pounds  strength to enhance functional use of left UE  Patient will score at least 70/80 on the UEFI    Long Term Goals:   Patient will achieve at least 80 degrees of wrist extension to enhance functional use of left UE.  Patient will have at least 60 pounds  strength to enhance functional use of left UE  Patient will score at least 75/80 on the UEFI    Plan:   Planned therapy interventions:  Therapeutic Exercise (CPT 39752)  Other planned therapy interventions:  97110 x 3  Frequency:  1x week  Duration in weeks:  8  Duration in visits:  8      Referring provider co-signature:  I have reviewed this plan of care and my co-signature certifies the need for services.    Certification Period: 05/24/2023 to 07/05/23    Physician Signature: ________________________________ Date: ______________

## 2023-05-24 NOTE — OP THERAPY DAILY TREATMENT
Outpatient Occupational Therapy  DAILY TREATMENT     [facility] Occupational Therapy [address].  [address]  [place] [address]  Phone:  [phone]  Fax:  [phone]    Date: 05/24/2023    Patient: Heri Solis  YOB: 1960  MRN: [id number]     Time Calculation  Start time: 0230  Stop time: 0315 Time Calculation (min): 45 minutes         Chief Complaint: Extremity Fracture    Visit #: 15    SUBJECTIVE:  No issues noted since last visit.     OBJECTIVE:  Current objective measures:   Active Range of Motion  Right Wrist   Wrist flexion: 75 degrees   Wrist extension: 70 degrees   Wrist pronation: WFL  Wrist supination: 75 degrees   Radial deviation: 20 degrees   Ulnar deviation: 20 degrees      Right Thumb     Normal active range of motion     Right Digits   Normal active range of motion     Passive Range of Motion      Right Wrist   Wrist flexion: 80 degrees   Wrist extension: 75 degrees   Wrist pronation: WFL  Wrist supination: 70 degrees   Radial deviation: 20 degrees   Ulnar deviation: 20 degrees      Strength      Left Wrist/Hand       (2nd hand position)     Trial 1: 48     Right Wrist/Hand       (2nd hand position)     Trial 1: 85     Tests      Left Wrist/Hand   Negative Phalen's sign and Tinel's sign (median nerve).      General Comments      Wrist/Hand Comments  9 hole peg test:  R= 20 seconds  L = 22 seconds         Therapeutic Exercises (CPT 47714):     1. AROM and AAROM of left supination, wrist flexion and extension along with active movements of the hand    2. medium resistance theraputty    3. isolated supination/pronation, 10 x    4. wrist maze, 6 x    5. fine motor manipulation and dexterity    6. 9# digi-flex, 10 x    7. 11# graded clip with 3 point prehension, 10 x    8. velcro roll with cylndrical grasp, 5 reps    9. Isolated wrist strengthening in flexion/extension and RD, 3 sets of 10 for each exercise with 3# hand weight    Therapeutic Exercise Summary:               Time-based treatments/modalities:  Therapeutic exercise minutes (CPT 94203): 45 minutes        Pain rating before treatment: 1  Pain rating after treatment: 2    ASSESSMENT:   Response to treatment: improving left  strength.  Progress note completed to request more visits.      PLAN/RECOMMENDATIONS:   Plan for treatment: therapy treatment to continue next visit.  Planned interventions for next visit: continue with current treatment and therapeutic exercise (CPT 31103)

## 2023-06-01 ENCOUNTER — OCCUPATIONAL THERAPY (OUTPATIENT)
Dept: OCCUPATIONAL THERAPY | Facility: REHABILITATION | Age: 63
End: 2023-06-01
Payer: MEDICAID

## 2023-06-01 DIAGNOSIS — S52.502A TRAUMATIC CLOSED DISPLACED FRACTURE OF DISTAL END OF LEFT RADIUS, INITIAL ENCOUNTER: ICD-10-CM

## 2023-06-01 PROCEDURE — 97110 THERAPEUTIC EXERCISES: CPT

## 2023-06-01 NOTE — OP THERAPY DAILY TREATMENT
Outpatient Occupational Therapy  DAILY TREATMENT     [facility] Occupational Therapy [address]  [address]  [place] [address]  Phone:  [phone]  Fax:  [phone]    Date: 06/01/2023    Patient: Heri Solis  YOB: 1960  MRN: [id number]     Time Calculation  Start time: 0845  Stop time: 0930 Time Calculation (min): 45 minutes         Chief Complaint: Extremity Fracture and Self Care Duties    Visit #: 16    SUBJECTIVE:  I have started doing more weight bearing through my left hand for short periods of time.      OBJECTIVE:  Current objective measures:   Active Range of Motion  Right Wrist   Wrist flexion: 75 degrees   Wrist extension: 70 degrees   Wrist pronation: WFL  Wrist supination: 75 degrees   Radial deviation: 20 degrees   Ulnar deviation: 20 degrees      Right Thumb     Normal active range of motion     Right Digits   Normal active range of motion     Passive Range of Motion      Right Wrist   Wrist flexion: 80 degrees   Wrist extension: 75 degrees   Wrist pronation: WFL  Wrist supination: 70 degrees   Radial deviation: 20 degrees   Ulnar deviation: 20 degrees      Strength      Left Wrist/Hand       (2nd hand position)     Trial 1: 48     Right Wrist/Hand       (2nd hand position)     Trial 1: 85     Tests      Left Wrist/Hand   Negative Phalen's sign and Tinel's sign (median nerve).      General Comments      Wrist/Hand Comments  9 hole peg test:  R= 20 seconds  L = 22 seconds         Therapeutic Exercises (CPT 96026):     1. AROM and AAROM of left supination, wrist flexion and extension along with active movements of the hand    2. medium resistance theraputty    3. isolated supination/pronation, 10 x    4. wrist maze, 6 x    5. fine motor manipulation and dexterity    6. 9# digi-flex, 10 x    7. 11# graded clip with 3 point prehension, 10 x    8. velcro roll with cylndrical grasp, 5 reps    9. Isolated wrist strengthening in flexion/extension and RD, 3 sets of  10 for each exercise with 3# hand weight    Therapeutic Exercise Summary:              Time-based treatments/modalities:  Therapeutic exercise minutes (CPT 19870): 45 minutes        Pain rating before treatment: 1  Pain rating after treatment: 2    ASSESSMENT:   Response to treatment: Remains highly motivated to participate in therapy and using left hand a lot more during functional tasks.      PLAN/RECOMMENDATIONS:   Plan for treatment: therapy treatment to continue next visit.  Planned interventions for next visit: continue with current treatment and therapeutic exercise (CPT 95549)

## 2023-06-05 ENCOUNTER — OCCUPATIONAL THERAPY (OUTPATIENT)
Dept: OCCUPATIONAL THERAPY | Facility: REHABILITATION | Age: 63
End: 2023-06-05
Payer: MEDICAID

## 2023-06-05 DIAGNOSIS — S52.502A TRAUMATIC CLOSED DISPLACED FRACTURE OF DISTAL END OF LEFT RADIUS, INITIAL ENCOUNTER: ICD-10-CM

## 2023-06-05 PROCEDURE — 97110 THERAPEUTIC EXERCISES: CPT

## 2023-06-05 NOTE — OP THERAPY DAILY TREATMENT
Outpatient Occupational Therapy  DAILY TREATMENT     [facility] Occupational Therapy [address].  [address]  [place] [address]  Phone:  [phone]  Fax:  [phone]    Date: 06/05/2023    Patient: Heri Solis  YOB: 1960  MRN: [id number]     Time Calculation  Start time: 0145  Stop time: 0230 Time Calculation (min): 45 minutes         Chief Complaint: Extremity Fracture    Visit #: 17    SUBJECTIVE:  My wrist is definitely getting better as I am doing a lot more with it at home.      OBJECTIVE:  Current objective measures:   Active Range of Motion  Right Wrist   Wrist flexion: 75 degrees   Wrist extension: 70 degrees   Wrist pronation: WFL  Wrist supination: 75 degrees   Radial deviation: 20 degrees   Ulnar deviation: 20 degrees      Right Thumb     Normal active range of motion     Right Digits   Normal active range of motion     Passive Range of Motion      Right Wrist   Wrist flexion: 80 degrees   Wrist extension: 75 degrees   Wrist pronation: WFL  Wrist supination: 70 degrees   Radial deviation: 20 degrees   Ulnar deviation: 20 degrees      Strength      Left Wrist/Hand       (2nd hand position)     Trial 1: 48     Right Wrist/Hand       (2nd hand position)     Trial 1: 85     Tests      Left Wrist/Hand   Negative Phalen's sign and Tinel's sign (median nerve).      General Comments      Wrist/Hand Comments  9 hole peg test:  R= 20 seconds  L = 22 seconds         Therapeutic Exercises (CPT 96558):     1. AROM and AAROM of left supination, wrist flexion and extension along with active movements of the hand    2. medium resistance theraputty    3. isolated supination/pronation, 10 x    4. wrist maze, 6 x    5. fine motor manipulation and dexterity    6. 9# digi-flex, 10 x    7. 11# graded clip with 3 point prehension, 10 x    8. velcro roll with cylndrical grasp, 5 reps    9. Isolated wrist strengthening in flexion/extension and RD, 3 sets of 10 for each exercise with 3#  hand weight    Therapeutic Exercise Summary:              Time-based treatments/modalities:  Therapeutic exercise minutes (CPT 59670): 45 minutes        Pain rating before treatment: 2  Pain rating after treatment: 2    ASSESSMENT:   Response to treatment: Highly motivated to participate in therapy. To continue with HEP 3 x a day with increased weight bearing through hand    PLAN/RECOMMENDATIONS:   Plan for treatment: therapy treatment to continue next visit.  Planned interventions for next visit: continue with current treatment and therapeutic exercise (CPT 93014)

## 2023-06-12 ENCOUNTER — OCCUPATIONAL THERAPY (OUTPATIENT)
Dept: OCCUPATIONAL THERAPY | Facility: REHABILITATION | Age: 63
End: 2023-06-12
Payer: MEDICAID

## 2023-06-12 DIAGNOSIS — S52.502A TRAUMATIC CLOSED DISPLACED FRACTURE OF DISTAL END OF LEFT RADIUS, INITIAL ENCOUNTER: ICD-10-CM

## 2023-06-12 PROCEDURE — 97110 THERAPEUTIC EXERCISES: CPT

## 2023-06-12 NOTE — OP THERAPY DAILY TREATMENT
Outpatient Occupational Therapy  DAILY TREATMENT     [facility] Occupational Therapy [address].  [address]  [place] [address]  Phone:  [phone]  Fax:  [phone]    Date: 06/12/2023    Patient: Heri Solis  YOB: 1960  MRN: [id number]     Time Calculation  Start time: 0145  Stop time: 0230 Time Calculation (min): 45 minutes         Chief Complaint: Extremity Fracture    Visit #: 18    SUBJECTIVE:   I can open jars better and I can turn my hand outwards now    OBJECTIVE:  Current objective measures:   Active Range of Motion  Right Wrist   Wrist flexion: 75 degrees   Wrist extension: 72 degrees   Wrist pronation: WFL  Wrist supination: 80 degrees   Radial deviation: 20 degrees   Ulnar deviation: 20 degrees      Right Thumb     Normal active range of motion     Right Digits   Normal active range of motion     Passive Range of Motion      Right Wrist   Wrist flexion: 80 degrees   Wrist extension: 75 degrees   Wrist pronation: WFL  Wrist supination: 90 degrees   Radial deviation: 20 degrees   Ulnar deviation: 20 degrees      Strength      Left Wrist/Hand       (2nd hand position)     Trial 1: 50     Right Wrist/Hand       (2nd hand position)     Trial 1: 85     Tests      Left Wrist/Hand   Negative Phalen's sign and Tinel's sign (median nerve).      General Comments      Wrist/Hand Comments  9 hole peg test:  R= 20 seconds  L = 22 seconds         Therapeutic Exercises (CPT 06256):     1. AROM and AAROM of left supination, wrist flexion and extension along with active movements of the hand    2. medium resistance theraputty    3. isolated supination/pronation, 10 x    4. wrist maze, 6 x    5. fine motor manipulation and dexterity    6. 9# digi-flex, 10 x    7. 11# graded clip with 3 point prehension, 10 x    8. velcro roll with cylndrical grasp, 5 reps    9. Isolated wrist strengthening in flexion/extension and RD, 3 sets of 10 for each exercise with 3# hand weight     Therapeutic Exercise Summary:              Time-based treatments/modalities:  Therapeutic exercise minutes (CPT 19256): 45 minutes        Pain rating before treatment: 2  Pain rating after treatment: 2    ASSESSMENT:   Response to treatment: Continued improvement with ROM and strength.  Patient stated he can use his left hand a lot more     PLAN/RECOMMENDATIONS:   Plan for treatment: therapy treatment to continue next visit.  Planned interventions for next visit: continue with current treatment and therapeutic exercise (CPT 61104)

## 2023-06-19 ENCOUNTER — OCCUPATIONAL THERAPY (OUTPATIENT)
Dept: OCCUPATIONAL THERAPY | Facility: REHABILITATION | Age: 63
End: 2023-06-19
Payer: MEDICAID

## 2023-06-19 DIAGNOSIS — S52.502A TRAUMATIC CLOSED DISPLACED FRACTURE OF DISTAL END OF LEFT RADIUS, INITIAL ENCOUNTER: ICD-10-CM

## 2023-06-19 PROCEDURE — 97110 THERAPEUTIC EXERCISES: CPT

## 2023-06-19 NOTE — OP THERAPY DAILY TREATMENT
Outpatient Occupational Therapy  DAILY TREATMENT     [facility] Occupational Therapy [address].  [address]  [place] [address]  Phone:  [phone]  Fax:  [phone]    Date: 06/19/2023    Patient: Heri Solis  YOB: 1960  MRN: [id number]     Time Calculation  Start time: 0145  Stop time: 0230 Time Calculation (min): 45 minutes         Chief Complaint: Extremity Fracture    Visit #: 19    SUBJECTIVE:  No issues noted since last visit.      OBJECTIVE:  Current objective measures:   Active Range of Motion  Right Wrist   Wrist flexion: 75 degrees   Wrist extension: 72 degrees   Wrist pronation: WFL  Wrist supination: 80 degrees   Radial deviation: 20 degrees   Ulnar deviation: 20 degrees      Right Thumb     Normal active range of motion     Right Digits   Normal active range of motion     Passive Range of Motion      Right Wrist   Wrist flexion: 80 degrees   Wrist extension: 75 degrees   Wrist pronation: WFL  Wrist supination: 90 degrees   Radial deviation: 20 degrees   Ulnar deviation: 20 degrees      Strength      Left Wrist/Hand       (2nd hand position)     Trial 1: 50     Right Wrist/Hand       (2nd hand position)     Trial 1: 85     Tests      Left Wrist/Hand   Negative Phalen's sign and Tinel's sign (median nerve).      General Comments      Wrist/Hand Comments  9 hole peg test:  R= 20 seconds  L = 22 seconds         Therapeutic Exercises (CPT 97300):     1. AROM and AAROM of left supination, wrist flexion and extension along with active movements of the hand    2. medium resistance theraputty    3. isolated supination/pronation, 10 x    4. wrist maze, 6 x    5. fine motor manipulation and dexterity    6. 9# digi-flex, 10 x    7. 11# graded clip with 3 point prehension, 10 x    8. velcro roll with cylndrical grasp, 5 reps    9. Isolated wrist strengthening in flexion/extension and RD, 3 sets of 10 for each exercise with 3# hand weight    Therapeutic Exercise Summary:               Time-based treatments/modalities:  Therapeutic exercise minutes (CPT 28963): 45 minutes        Pain rating before treatment: 2  Pain rating after treatment: 2    ASSESSMENT:   Response to treatment: improving ROM and strength is status quo.     PLAN/RECOMMENDATIONS:   Plan for treatment: therapy treatment to continue next visit.  Planned interventions for next visit: continue with current treatment and therapeutic exercise (CPT 26320)

## 2023-06-26 ENCOUNTER — OCCUPATIONAL THERAPY (OUTPATIENT)
Dept: OCCUPATIONAL THERAPY | Facility: REHABILITATION | Age: 63
End: 2023-06-26
Payer: MEDICAID

## 2023-06-26 DIAGNOSIS — S52.502A TRAUMATIC CLOSED DISPLACED FRACTURE OF DISTAL END OF LEFT RADIUS, INITIAL ENCOUNTER: ICD-10-CM

## 2023-06-26 PROCEDURE — 97110 THERAPEUTIC EXERCISES: CPT

## 2023-06-26 NOTE — OP THERAPY DISCHARGE SUMMARY
Outpatient Occupational Therapy  DISCHARGE SUMMARY NOTE    [facility] Occupational Therapy [address]  [address]  [place] [address]  Phone:  [phone]  Fax:  [phone]    Date of Visit: 06/26/2023    Patient: Heri Solis  YOB: 1960  MRN: [id number]     Referring Provider: Ashlee Whatley P.A.-C.  [address] [address]   Referring Diagnosis: No admission diagnoses are documented for this encounter.         Functional Assessment Used:  Upper Extremity Functional Index = 75/80        Your patient is being discharged from Occupational Therapy with the following comments:   Goals met    Comments:  Patient has done very well with therapy     Limitations Remaining:  Continue working on  strength and weight bearing through left hand    Recommendations:  Continue with ANDRZEJ Persaud/L    Date: 6/26/2023

## 2023-06-26 NOTE — OP THERAPY DAILY TREATMENT
Outpatient Occupational Therapy  DAILY TREATMENT     [facility] Occupational Therapy [address].  [address]  [place] [address]  Phone:  [phone]  Fax:  [phone]    Date: 06/26/2023    Patient: Heri Solis  YOB: 1960  MRN: [id number]     Time Calculation  Start time: 0145  Stop time: 0230 Time Calculation (min): 45 minutes         Chief Complaint: Extremity Fracture    Visit #: 20    SUBJECTIVE:  I think I am happy to just continue with my exercises at home.      OBJECTIVE:  Current objective measures:   Active Range of Motion  Right Wrist   Wrist flexion: 75 degrees   Wrist extension: 72 degrees   Wrist pronation: WFL  Wrist supination: 80 degrees   Radial deviation: 20 degrees   Ulnar deviation: 20 degrees      Right Thumb     Normal active range of motion     Right Digits   Normal active range of motion     Passive Range of Motion      Right Wrist   Wrist flexion: 85 degrees   Wrist extension: 75 degrees   Wrist pronation: WFL  Wrist supination: 90 degrees   Radial deviation: 20 degrees   Ulnar deviation: 20 degrees      Strength      Left Wrist/Hand       (2nd hand position)     Trial 1: 55     Right Wrist/Hand       (2nd hand position)     Trial 1: 85     Tests      Left Wrist/Hand   Negative Phalen's sign and Tinel's sign (median nerve).      General Comments      Wrist/Hand Comments  9 hole peg test:  R= 20 seconds  L = 22 seconds         Therapeutic Exercises (CPT 01267):     1. AROM and AAROM of left supination, wrist flexion and extension along with active movements of the hand    2. medium resistance theraputty    3. isolated supination/pronation, 10 x    4. wrist maze, 6 x    5. fine motor manipulation and dexterity    6. 9# digi-flex, 10 x    7. 11# graded clip with 3 point prehension, 10 x    8. velcro roll with cylndrical grasp, 5 reps    9. Isolated wrist strengthening in flexion/extension and RD, 3 sets of 10 for each exercise with 4# hand weight     Therapeutic Exercise Summary:              Time-based treatments/modalities:  Therapeutic exercise minutes (CPT 52776): 45 minutes        Pain rating before treatment: 1  Pain rating after treatment: 1    ASSESSMENT:   Response to treatment: Patient has done well with therapy and will discharge today    PLAN/RECOMMENDATIONS:   Plan for treatment: no further treatment needed.  Planned interventions for next visit:  last therapy episode today.  Discharge summary to follow.

## 2023-07-06 ENCOUNTER — APPOINTMENT (OUTPATIENT)
Dept: OCCUPATIONAL THERAPY | Facility: REHABILITATION | Age: 63
End: 2023-07-06
Payer: MEDICAID

## (undated) DEVICE — SUTURE 0 VICRYL PLUS CT-1 - 36 INCH (36/BX)

## (undated) DEVICE — SODIUM CHL IRRIGATION 0.9% 1000ML (12EA/CA)

## (undated) DEVICE — DRAPE LARGE 3 QUARTER - (20/CA)

## (undated) DEVICE — TUBING CLEARLINK DUO-VENT - C-FLO (48EA/CA)

## (undated) DEVICE — SUTURE 2-0 VICRYL PLUS CT-1 36 (36PK/BX)"

## (undated) DEVICE — SUTURE 3-0 ETHILON FS-1 - (36/BX) 30 INCH

## (undated) DEVICE — TOWEL STOP TIMEOUT SAFETY FLAG (40EA/CA)

## (undated) DEVICE — GLOVE BIOGEL INDICATOR SZ 7.5 SURGICAL PF LTX - (50PR/BX 4BX/CA)

## (undated) DEVICE — PADDING CAST 4 IN STERILE - 4 X 4 YDS (24/CA)

## (undated) DEVICE — SUTURE GENERAL

## (undated) DEVICE — PACK UPPER EXTREMITY (2EA/CA)

## (undated) DEVICE — COVER LIGHT HANDLE ALC PLUS DISP (18EA/BX)

## (undated) DEVICE — CANISTER SUCTION 3000ML MECHANICAL FILTER AUTO SHUTOFF MEDI-VAC NONSTERILE LF DISP  (40EA/CA)

## (undated) DEVICE — GOWN SURGEONS X-LARGE - DISP. (30/CA)

## (undated) DEVICE — GLOVE BIOGEL SZ 7 SURGICAL PF LTX - (50PR/BX 4BX/CA)

## (undated) DEVICE — ELECTRODE DUAL RETURN W/ CORD - (50/PK)

## (undated) DEVICE — SPLINT PLASTER 4 IN  X 15 IN - (50/BX 12BX/CA)

## (undated) DEVICE — BANDAGE ELASTIC STERILE VELCRO 6 X 5 YDS (25EA/CA)

## (undated) DEVICE — PAD PREP 24 X 48 CUFFED - (100/CA)

## (undated) DEVICE — BANDAGE ELASTIC 4 HONEYCOMB - 4"X5YD LF (20/CA)"

## (undated) DEVICE — SUCTION INSTRUMENT YANKAUER BULBOUS TIP W/O VENT (50EA/CA)

## (undated) DEVICE — GLOVE BIOGEL SZ 7.5 SURGICAL PF LTX - (50PR/BX 4BX/CA)

## (undated) DEVICE — SET EXTENSION WITH 2 PORTS (48EA/CA) ***PART #2C8610 IS A SUBSTITUTE*****

## (undated) DEVICE — PAD LAP STERILE 18 X 18 - (5/PK 40PK/CA)

## (undated) DEVICE — SENSOR OXIMETER ADULT SPO2 RD SET (20EA/BX)

## (undated) DEVICE — SLEEVE VASO CALF MED - (10PR/CA)

## (undated) DEVICE — GLOVE BIOGEL PI INDICATOR SZ 6.5 SURGICAL PF LF - (50/BX 4BX/CA)

## (undated) DEVICE — TOWELS CLOTH SURGICAL - (4/PK 20PK/CA)

## (undated) DEVICE — PADDING CAST 6 IN STERILE - 6 X 4 YDS (24/CA)

## (undated) DEVICE — DRAPE STRLE REG TOWEL 18X24 - (10/BX 4BX/CA)"

## (undated) DEVICE — LACTATED RINGERS INJ 1000 ML - (14EA/CA 60CA/PF)

## (undated) DEVICE — SET LEADWIRE 5 LEAD BEDSIDE DISPOSABLE ECG (1SET OF 5/EA)

## (undated) DEVICE — GLOVE SZ 7.5 BIOGEL PI MICRO - PF LF (50PR/BX)

## (undated) DEVICE — CHLORAPREP 26 ML APPLICATOR - ORANGE TINT(25/CA)

## (undated) DEVICE — DRAPE 36X28IN RAD CARM BND BG - (25/CA) O

## (undated) DEVICE — GLOVE BIOGEL PI INDICATOR SZ 7.5 SURGICAL PF LF -(50/BX 4BX/CA)